# Patient Record
Sex: MALE | Race: WHITE | NOT HISPANIC OR LATINO | Employment: OTHER | URBAN - METROPOLITAN AREA
[De-identification: names, ages, dates, MRNs, and addresses within clinical notes are randomized per-mention and may not be internally consistent; named-entity substitution may affect disease eponyms.]

---

## 2017-01-03 PROCEDURE — 88305 TISSUE EXAM BY PATHOLOGIST: CPT | Performed by: INTERNAL MEDICINE

## 2017-01-05 PROBLEM — N32.81 OAB (OVERACTIVE BLADDER): Status: ACTIVE | Noted: 2017-01-05

## 2017-07-06 PROBLEM — R35.0 URINARY FREQUENCY: Status: ACTIVE | Noted: 2017-07-06

## 2017-07-06 PROBLEM — Z80.42 FAMILY HISTORY OF PROSTATE CANCER: Status: ACTIVE | Noted: 2017-07-06

## 2017-08-01 PROCEDURE — 86334 IMMUNOFIX E-PHORESIS SERUM: CPT | Performed by: OTHER

## 2017-08-01 PROCEDURE — 84165 PROTEIN E-PHORESIS SERUM: CPT | Performed by: OTHER

## 2017-08-01 PROCEDURE — 84238 ASSAY NONENDOCRINE RECEPTOR: CPT | Performed by: OTHER

## 2017-08-01 PROCEDURE — 82746 ASSAY OF FOLIC ACID SERUM: CPT | Performed by: OTHER

## 2017-08-01 PROCEDURE — 83516 IMMUNOASSAY NONANTIBODY: CPT | Performed by: OTHER

## 2017-08-01 PROCEDURE — 83519 RIA NONANTIBODY: CPT | Performed by: OTHER

## 2017-08-01 PROCEDURE — 86255 FLUORESCENT ANTIBODY SCREEN: CPT | Performed by: OTHER

## 2017-08-01 PROCEDURE — 83883 ASSAY NEPHELOMETRY NOT SPEC: CPT | Performed by: OTHER

## 2017-08-01 PROCEDURE — 82607 VITAMIN B-12: CPT | Performed by: OTHER

## 2017-10-23 PROBLEM — I70.0 AORTO-ILIAC ATHEROSCLEROSIS (HCC): Status: ACTIVE | Noted: 2017-10-23

## 2017-10-23 PROBLEM — F13.20 BENZODIAZEPINE DEPENDENCE (HCC): Status: ACTIVE | Noted: 2017-10-23

## 2017-10-23 PROBLEM — I50.32 CHRONIC DIASTOLIC CHF (CONGESTIVE HEART FAILURE) (HCC): Status: ACTIVE | Noted: 2017-10-23

## 2017-10-23 PROBLEM — I70.8 AORTO-ILIAC ATHEROSCLEROSIS (HCC): Status: ACTIVE | Noted: 2017-10-23

## 2017-11-07 PROBLEM — R26.81 GAIT INSTABILITY: Status: ACTIVE | Noted: 2017-11-07

## 2017-11-07 PROBLEM — M54.50 CHRONIC BILATERAL LOW BACK PAIN WITHOUT SCIATICA: Status: ACTIVE | Noted: 2017-11-07

## 2017-11-07 PROBLEM — G89.29 CHRONIC BILATERAL LOW BACK PAIN WITHOUT SCIATICA: Status: ACTIVE | Noted: 2017-11-07

## 2018-03-13 PROCEDURE — 81003 URINALYSIS AUTO W/O SCOPE: CPT | Performed by: INTERNAL MEDICINE

## 2018-10-26 PROCEDURE — 84402 ASSAY OF FREE TESTOSTERONE: CPT | Performed by: INTERNAL MEDICINE

## 2018-10-26 PROCEDURE — 84403 ASSAY OF TOTAL TESTOSTERONE: CPT | Performed by: INTERNAL MEDICINE

## 2019-03-28 PROCEDURE — 88311 DECALCIFY TISSUE: CPT | Performed by: RADIOLOGY

## 2019-03-28 PROCEDURE — 88307 TISSUE EXAM BY PATHOLOGIST: CPT | Performed by: RADIOLOGY

## 2019-03-28 PROCEDURE — 86141 C-REACTIVE PROTEIN HS: CPT | Performed by: RADIOLOGY

## 2019-03-28 PROCEDURE — 84402 ASSAY OF FREE TESTOSTERONE: CPT | Performed by: UROLOGY

## 2019-03-28 PROCEDURE — 88341 IMHCHEM/IMCYTCHM EA ADD ANTB: CPT | Performed by: RADIOLOGY

## 2019-03-28 PROCEDURE — 84403 ASSAY OF TOTAL TESTOSTERONE: CPT | Performed by: UROLOGY

## 2019-03-28 PROCEDURE — 88342 IMHCHEM/IMCYTCHM 1ST ANTB: CPT | Performed by: RADIOLOGY

## 2019-04-11 PROBLEM — C61 PROSTATE CANCER (HCC): Status: ACTIVE | Noted: 2019-04-11

## 2019-04-11 PROBLEM — F41.8 ANXIETY ABOUT HEALTH: Status: ACTIVE | Noted: 2019-04-11

## 2019-05-07 PROBLEM — F13.20 BENZODIAZEPINE DEPENDENCE (HCC): Status: RESOLVED | Noted: 2017-10-23 | Resolved: 2019-05-07

## 2019-06-21 PROBLEM — R29.898 WEAKNESS OF BOTH LOWER EXTREMITIES: Status: ACTIVE | Noted: 2019-06-21

## 2019-06-25 PROCEDURE — 84153 ASSAY OF PSA TOTAL: CPT | Performed by: RADIOLOGY

## 2019-06-25 PROCEDURE — 36415 COLL VENOUS BLD VENIPUNCTURE: CPT | Performed by: RADIOLOGY

## 2019-08-01 PROCEDURE — 84165 PROTEIN E-PHORESIS SERUM: CPT | Performed by: OTHER

## 2019-08-01 PROCEDURE — 83883 ASSAY NEPHELOMETRY NOT SPEC: CPT | Performed by: OTHER

## 2019-08-01 PROCEDURE — 86334 IMMUNOFIX E-PHORESIS SERUM: CPT | Performed by: OTHER

## 2019-08-21 PROBLEM — M54.17 L-S RADICULOPATHY: Status: ACTIVE | Noted: 2019-08-21

## 2019-08-21 PROBLEM — R26.9 GAIT ABNORMALITY: Status: ACTIVE | Noted: 2019-08-21

## 2019-08-21 PROBLEM — G62.9 POLYNEUROPATHY: Status: ACTIVE | Noted: 2019-08-21

## 2019-08-21 PROBLEM — M48.07 LUMBOSACRAL STENOSIS: Status: ACTIVE | Noted: 2019-08-21

## 2019-08-21 PROBLEM — R20.0 NUMBNESS: Status: ACTIVE | Noted: 2019-08-21

## 2020-01-20 PROBLEM — G89.29 CHRONIC RIGHT-SIDED LOW BACK PAIN WITHOUT SCIATICA: Status: ACTIVE | Noted: 2020-01-20

## 2020-01-20 PROBLEM — M54.50 CHRONIC RIGHT-SIDED LOW BACK PAIN WITHOUT SCIATICA: Status: ACTIVE | Noted: 2020-01-20

## 2020-01-20 PROBLEM — R29.3 POSTURE ABNORMALITY: Status: ACTIVE | Noted: 2020-01-20

## 2020-04-20 PROBLEM — C61 PROSTATE CANCER (HCC): Status: RESOLVED | Noted: 2019-04-11 | Resolved: 2020-04-20

## 2020-06-09 PROBLEM — F02.80 DEMENTIA IN OTHER DISEASES CLASSIFIED ELSEWHERE WITHOUT BEHAVIORAL DISTURBANCE (HCC): Status: ACTIVE | Noted: 2020-06-09

## 2020-06-09 PROBLEM — E66.01: Status: ACTIVE | Noted: 2020-06-09

## 2020-06-09 PROBLEM — G20 PARKINSON'S DISEASE (HCC): Status: ACTIVE | Noted: 2020-06-09

## 2020-06-12 PROBLEM — F32.1 CURRENT MODERATE EPISODE OF MAJOR DEPRESSIVE DISORDER WITHOUT PRIOR EPISODE (HCC): Status: ACTIVE | Noted: 2020-06-12

## 2020-08-28 PROBLEM — R26.9 GAIT ABNORMALITY: Status: RESOLVED | Noted: 2019-08-21 | Resolved: 2020-08-28

## 2020-08-28 PROBLEM — R29.3 POSTURE ABNORMALITY: Status: RESOLVED | Noted: 2020-01-20 | Resolved: 2020-08-28

## 2021-02-03 ENCOUNTER — IMMUNIZATION (OUTPATIENT)
Dept: LAB | Age: 76
End: 2021-02-03

## 2021-02-03 DIAGNOSIS — Z23 NEED FOR VACCINATION: Primary | ICD-10-CM

## 2021-02-03 PROCEDURE — 0011A COVID-19 MODERNA VACCINE: CPT

## 2021-02-03 PROCEDURE — 91301 COVID-19 MODERNA VACCINE: CPT

## 2021-02-09 PROBLEM — R20.0 NUMBNESS: Status: RESOLVED | Noted: 2019-08-21 | Resolved: 2021-02-09

## 2021-02-09 PROBLEM — E66.9 OBESITY (BMI 30-39.9): Status: ACTIVE | Noted: 2021-02-09

## 2021-02-15 ENCOUNTER — LAB ENCOUNTER (OUTPATIENT)
Dept: LAB | Facility: HOSPITAL | Age: 76
End: 2021-02-15
Attending: ORTHOPAEDIC SURGERY
Payer: MEDICARE

## 2021-02-15 DIAGNOSIS — M48.061 SPINAL STENOSIS OF LUMBAR REGION WITHOUT NEUROGENIC CLAUDICATION: ICD-10-CM

## 2021-02-15 LAB — SARS-COV-2 RNA RESP QL NAA+PROBE: NOT DETECTED

## 2021-02-18 ENCOUNTER — HOSPITAL ENCOUNTER (INPATIENT)
Facility: HOSPITAL | Age: 76
LOS: 6 days | Discharge: NURSING, REHAB OR LONG-TERM CARE | DRG: 460 | End: 2021-02-24
Attending: ORTHOPAEDIC SURGERY | Admitting: ORTHOPAEDIC SURGERY
Payer: MEDICARE

## 2021-02-18 ENCOUNTER — ANESTHESIA (OUTPATIENT)
Dept: SURGERY | Facility: HOSPITAL | Age: 76
DRG: 460 | End: 2021-02-18
Payer: MEDICARE

## 2021-02-18 ENCOUNTER — ANESTHESIA EVENT (OUTPATIENT)
Dept: SURGERY | Facility: HOSPITAL | Age: 76
DRG: 460 | End: 2021-02-18
Payer: MEDICARE

## 2021-02-18 ENCOUNTER — APPOINTMENT (OUTPATIENT)
Dept: GENERAL RADIOLOGY | Facility: HOSPITAL | Age: 76
DRG: 460 | End: 2021-02-18
Attending: ORTHOPAEDIC SURGERY
Payer: MEDICARE

## 2021-02-18 DIAGNOSIS — M48.061 SPINAL STENOSIS OF LUMBAR REGION WITHOUT NEUROGENIC CLAUDICATION: Primary | ICD-10-CM

## 2021-02-18 PROCEDURE — 72020 X-RAY EXAM OF SPINE 1 VIEW: CPT | Performed by: ORTHOPAEDIC SURGERY

## 2021-02-18 PROCEDURE — 5A09357 ASSISTANCE WITH RESPIRATORY VENTILATION, LESS THAN 24 CONSECUTIVE HOURS, CONTINUOUS POSITIVE AIRWAY PRESSURE: ICD-10-PCS | Performed by: ORTHOPAEDIC SURGERY

## 2021-02-18 PROCEDURE — 88304 TISSUE EXAM BY PATHOLOGIST: CPT | Performed by: ORTHOPAEDIC SURGERY

## 2021-02-18 PROCEDURE — 88311 DECALCIFY TISSUE: CPT | Performed by: ORTHOPAEDIC SURGERY

## 2021-02-18 PROCEDURE — 00UT0KZ SUPPLEMENT SPINAL MENINGES WITH NONAUTOLOGOUS TISSUE SUBSTITUTE, OPEN APPROACH: ICD-10-PCS | Performed by: ORTHOPAEDIC SURGERY

## 2021-02-18 PROCEDURE — 94660 CPAP INITIATION&MGMT: CPT

## 2021-02-18 PROCEDURE — 00BT0ZZ EXCISION OF SPINAL MENINGES, OPEN APPROACH: ICD-10-PCS | Performed by: ORTHOPAEDIC SURGERY

## 2021-02-18 PROCEDURE — 0SG1071 FUSION OF 2 OR MORE LUMBAR VERTEBRAL JOINTS WITH AUTOLOGOUS TISSUE SUBSTITUTE, POSTERIOR APPROACH, POSTERIOR COLUMN, OPEN APPROACH: ICD-10-PCS | Performed by: ORTHOPAEDIC SURGERY

## 2021-02-18 DEVICE — PATCH DURAL DURAMATRIX 1X3: Type: IMPLANTABLE DEVICE | Site: BACK | Status: FUNCTIONAL

## 2021-02-18 RX ORDER — DIPHENHYDRAMINE HCL 25 MG
25 CAPSULE ORAL EVERY 4 HOURS PRN
Status: DISCONTINUED | OUTPATIENT
Start: 2021-02-18 | End: 2021-02-24

## 2021-02-18 RX ORDER — ONDANSETRON 2 MG/ML
INJECTION INTRAMUSCULAR; INTRAVENOUS AS NEEDED
Status: DISCONTINUED | OUTPATIENT
Start: 2021-02-18 | End: 2021-02-18 | Stop reason: SURG

## 2021-02-18 RX ORDER — HYDRALAZINE HYDROCHLORIDE 20 MG/ML
INJECTION INTRAMUSCULAR; INTRAVENOUS
Status: DISCONTINUED
Start: 2021-02-18 | End: 2021-02-18 | Stop reason: WASHOUT

## 2021-02-18 RX ORDER — HYDROMORPHONE HYDROCHLORIDE 1 MG/ML
0.2 INJECTION, SOLUTION INTRAMUSCULAR; INTRAVENOUS; SUBCUTANEOUS EVERY 2 HOUR PRN
Status: DISCONTINUED | OUTPATIENT
Start: 2021-02-18 | End: 2021-02-24

## 2021-02-18 RX ORDER — ROCURONIUM BROMIDE 10 MG/ML
INJECTION, SOLUTION INTRAVENOUS AS NEEDED
Status: DISCONTINUED | OUTPATIENT
Start: 2021-02-18 | End: 2021-02-18 | Stop reason: SURG

## 2021-02-18 RX ORDER — MIDAZOLAM HYDROCHLORIDE 1 MG/ML
INJECTION INTRAMUSCULAR; INTRAVENOUS AS NEEDED
Status: DISCONTINUED | OUTPATIENT
Start: 2021-02-18 | End: 2021-02-18 | Stop reason: SURG

## 2021-02-18 RX ORDER — HYDROCODONE BITARTRATE AND ACETAMINOPHEN 5; 325 MG/1; MG/1
2 TABLET ORAL AS NEEDED
Status: DISCONTINUED | OUTPATIENT
Start: 2021-02-18 | End: 2021-02-18 | Stop reason: HOSPADM

## 2021-02-18 RX ORDER — ONDANSETRON 2 MG/ML
4 INJECTION INTRAMUSCULAR; INTRAVENOUS EVERY 4 HOURS PRN
Status: ACTIVE | OUTPATIENT
Start: 2021-02-18 | End: 2021-02-19

## 2021-02-18 RX ORDER — CEFAZOLIN SODIUM/WATER 2 G/20 ML
2 SYRINGE (ML) INTRAVENOUS ONCE
Status: COMPLETED | OUTPATIENT
Start: 2021-02-18 | End: 2021-02-18

## 2021-02-18 RX ORDER — CARVEDILOL 6.25 MG/1
6.25 TABLET ORAL 2 TIMES DAILY
Status: DISCONTINUED | OUTPATIENT
Start: 2021-02-18 | End: 2021-02-24

## 2021-02-18 RX ORDER — DIPHENHYDRAMINE HYDROCHLORIDE 50 MG/ML
25 INJECTION INTRAMUSCULAR; INTRAVENOUS EVERY 4 HOURS PRN
Status: DISCONTINUED | OUTPATIENT
Start: 2021-02-18 | End: 2021-02-24

## 2021-02-18 RX ORDER — HYDRALAZINE HYDROCHLORIDE 20 MG/ML
5 INJECTION INTRAMUSCULAR; INTRAVENOUS ONCE
Status: DISCONTINUED | OUTPATIENT
Start: 2021-02-18 | End: 2021-02-18 | Stop reason: HOSPADM

## 2021-02-18 RX ORDER — HYDROMORPHONE HYDROCHLORIDE 1 MG/ML
0.4 INJECTION, SOLUTION INTRAMUSCULAR; INTRAVENOUS; SUBCUTANEOUS EVERY 2 HOUR PRN
Status: DISCONTINUED | OUTPATIENT
Start: 2021-02-18 | End: 2021-02-24

## 2021-02-18 RX ORDER — GLYCOPYRROLATE 0.2 MG/ML
INJECTION, SOLUTION INTRAMUSCULAR; INTRAVENOUS AS NEEDED
Status: DISCONTINUED | OUTPATIENT
Start: 2021-02-18 | End: 2021-02-18 | Stop reason: SURG

## 2021-02-18 RX ORDER — VANCOMYCIN HYDROCHLORIDE 1 G/20ML
INJECTION, POWDER, LYOPHILIZED, FOR SOLUTION INTRAVENOUS AS NEEDED
Status: DISCONTINUED | OUTPATIENT
Start: 2021-02-18 | End: 2021-02-18 | Stop reason: HOSPADM

## 2021-02-18 RX ORDER — BUPIVACAINE HYDROCHLORIDE AND EPINEPHRINE 5; 5 MG/ML; UG/ML
INJECTION, SOLUTION EPIDURAL; INTRACAUDAL; PERINEURAL AS NEEDED
Status: DISCONTINUED | OUTPATIENT
Start: 2021-02-18 | End: 2021-02-18 | Stop reason: HOSPADM

## 2021-02-18 RX ORDER — METOCLOPRAMIDE HYDROCHLORIDE 5 MG/ML
10 INJECTION INTRAMUSCULAR; INTRAVENOUS AS NEEDED
Status: DISCONTINUED | OUTPATIENT
Start: 2021-02-18 | End: 2021-02-18 | Stop reason: HOSPADM

## 2021-02-18 RX ORDER — CEFAZOLIN SODIUM/WATER 2 G/20 ML
2 SYRINGE (ML) INTRAVENOUS EVERY 8 HOURS
Status: COMPLETED | OUTPATIENT
Start: 2021-02-18 | End: 2021-02-19

## 2021-02-18 RX ORDER — LIDOCAINE HYDROCHLORIDE 10 MG/ML
INJECTION, SOLUTION EPIDURAL; INFILTRATION; INTRACAUDAL; PERINEURAL AS NEEDED
Status: DISCONTINUED | OUTPATIENT
Start: 2021-02-18 | End: 2021-02-18 | Stop reason: SURG

## 2021-02-18 RX ORDER — DEXAMETHASONE SODIUM PHOSPHATE 4 MG/ML
4 VIAL (ML) INJECTION AS NEEDED
Status: DISCONTINUED | OUTPATIENT
Start: 2021-02-18 | End: 2021-02-18 | Stop reason: HOSPADM

## 2021-02-18 RX ORDER — METHOCARBAMOL 750 MG/1
750 TABLET, FILM COATED ORAL 3 TIMES DAILY
Status: DISCONTINUED | OUTPATIENT
Start: 2021-02-18 | End: 2021-02-24

## 2021-02-18 RX ORDER — HYDROMORPHONE HYDROCHLORIDE 1 MG/ML
0.8 INJECTION, SOLUTION INTRAMUSCULAR; INTRAVENOUS; SUBCUTANEOUS EVERY 2 HOUR PRN
Status: DISCONTINUED | OUTPATIENT
Start: 2021-02-18 | End: 2021-02-24

## 2021-02-18 RX ORDER — SODIUM CHLORIDE, SODIUM LACTATE, POTASSIUM CHLORIDE, CALCIUM CHLORIDE 600; 310; 30; 20 MG/100ML; MG/100ML; MG/100ML; MG/100ML
INJECTION, SOLUTION INTRAVENOUS CONTINUOUS
Status: DISCONTINUED | OUTPATIENT
Start: 2021-02-18 | End: 2021-02-18 | Stop reason: HOSPADM

## 2021-02-18 RX ORDER — SODIUM PHOSPHATE, DIBASIC AND SODIUM PHOSPHATE, MONOBASIC 7; 19 G/133ML; G/133ML
1 ENEMA RECTAL ONCE AS NEEDED
Status: DISCONTINUED | OUTPATIENT
Start: 2021-02-18 | End: 2021-02-24

## 2021-02-18 RX ORDER — OXYCODONE HYDROCHLORIDE 10 MG/1
10 TABLET ORAL EVERY 4 HOURS PRN
Status: DISCONTINUED | OUTPATIENT
Start: 2021-02-18 | End: 2021-02-19

## 2021-02-18 RX ORDER — ACETAMINOPHEN 325 MG/1
650 TABLET ORAL EVERY 4 HOURS PRN
Status: DISCONTINUED | OUTPATIENT
Start: 2021-02-18 | End: 2021-02-24

## 2021-02-18 RX ORDER — DEXAMETHASONE SODIUM PHOSPHATE 4 MG/ML
VIAL (ML) INJECTION AS NEEDED
Status: DISCONTINUED | OUTPATIENT
Start: 2021-02-18 | End: 2021-02-18 | Stop reason: SURG

## 2021-02-18 RX ORDER — EPHEDRINE SULFATE 50 MG/ML
INJECTION INTRAVENOUS AS NEEDED
Status: DISCONTINUED | OUTPATIENT
Start: 2021-02-18 | End: 2021-02-18 | Stop reason: SURG

## 2021-02-18 RX ORDER — ONDANSETRON 2 MG/ML
4 INJECTION INTRAMUSCULAR; INTRAVENOUS AS NEEDED
Status: DISCONTINUED | OUTPATIENT
Start: 2021-02-18 | End: 2021-02-18 | Stop reason: HOSPADM

## 2021-02-18 RX ORDER — ONDANSETRON 2 MG/ML
4 INJECTION INTRAMUSCULAR; INTRAVENOUS ONCE
Status: COMPLETED | OUTPATIENT
Start: 2021-02-18 | End: 2021-02-18

## 2021-02-18 RX ORDER — INSULIN ASPART 100 [IU]/ML
INJECTION, SOLUTION INTRAVENOUS; SUBCUTANEOUS ONCE
Status: DISCONTINUED | OUTPATIENT
Start: 2021-02-18 | End: 2021-02-18 | Stop reason: HOSPADM

## 2021-02-18 RX ORDER — ACETAMINOPHEN 500 MG
1000 TABLET ORAL ONCE
Status: COMPLETED | OUTPATIENT
Start: 2021-02-18 | End: 2021-02-23

## 2021-02-18 RX ORDER — DOCUSATE SODIUM 100 MG/1
100 CAPSULE, LIQUID FILLED ORAL 2 TIMES DAILY
Status: DISCONTINUED | OUTPATIENT
Start: 2021-02-18 | End: 2021-02-24

## 2021-02-18 RX ORDER — HYDROMORPHONE HYDROCHLORIDE 1 MG/ML
0.4 INJECTION, SOLUTION INTRAMUSCULAR; INTRAVENOUS; SUBCUTANEOUS EVERY 5 MIN PRN
Status: DISCONTINUED | OUTPATIENT
Start: 2021-02-18 | End: 2021-02-18 | Stop reason: HOSPADM

## 2021-02-18 RX ORDER — BISACODYL 10 MG
10 SUPPOSITORY, RECTAL RECTAL
Status: DISCONTINUED | OUTPATIENT
Start: 2021-02-18 | End: 2021-02-24

## 2021-02-18 RX ORDER — CELECOXIB 200 MG/1
200 CAPSULE ORAL ONCE
Status: COMPLETED | OUTPATIENT
Start: 2021-02-18 | End: 2021-02-18

## 2021-02-18 RX ORDER — SODIUM CHLORIDE, SODIUM LACTATE, POTASSIUM CHLORIDE, CALCIUM CHLORIDE 600; 310; 30; 20 MG/100ML; MG/100ML; MG/100ML; MG/100ML
INJECTION, SOLUTION INTRAVENOUS CONTINUOUS
Status: DISCONTINUED | OUTPATIENT
Start: 2021-02-18 | End: 2021-02-24

## 2021-02-18 RX ORDER — NEOSTIGMINE METHYLSULFATE 1 MG/ML
INJECTION INTRAVENOUS AS NEEDED
Status: DISCONTINUED | OUTPATIENT
Start: 2021-02-18 | End: 2021-02-18 | Stop reason: SURG

## 2021-02-18 RX ORDER — IBUPROFEN 600 MG/1
600 TABLET ORAL ONCE AS NEEDED
Status: DISCONTINUED | OUTPATIENT
Start: 2021-02-18 | End: 2021-02-18 | Stop reason: HOSPADM

## 2021-02-18 RX ORDER — NALOXONE HYDROCHLORIDE 0.4 MG/ML
80 INJECTION, SOLUTION INTRAMUSCULAR; INTRAVENOUS; SUBCUTANEOUS AS NEEDED
Status: DISCONTINUED | OUTPATIENT
Start: 2021-02-18 | End: 2021-02-18 | Stop reason: HOSPADM

## 2021-02-18 RX ORDER — SODIUM CHLORIDE 9 MG/ML
INJECTION, SOLUTION INTRAVENOUS CONTINUOUS
Status: DISCONTINUED | OUTPATIENT
Start: 2021-02-18 | End: 2021-02-20

## 2021-02-18 RX ORDER — ACETAMINOPHEN 500 MG
1000 TABLET ORAL ONCE
Status: ON HOLD | COMMUNITY
End: 2021-02-19

## 2021-02-18 RX ORDER — BACITRACIN 50000 [USP'U]/1
INJECTION, POWDER, LYOPHILIZED, FOR SOLUTION INTRAMUSCULAR AS NEEDED
Status: DISCONTINUED | OUTPATIENT
Start: 2021-02-18 | End: 2021-02-18 | Stop reason: HOSPADM

## 2021-02-18 RX ORDER — AMLODIPINE BESYLATE 5 MG/1
5 TABLET ORAL DAILY
Status: DISCONTINUED | OUTPATIENT
Start: 2021-02-19 | End: 2021-02-20

## 2021-02-18 RX ORDER — LOSARTAN POTASSIUM 50 MG/1
50 TABLET ORAL 2 TIMES DAILY
Status: DISCONTINUED | OUTPATIENT
Start: 2021-02-18 | End: 2021-02-24

## 2021-02-18 RX ORDER — PROCHLORPERAZINE EDISYLATE 5 MG/ML
10 INJECTION INTRAMUSCULAR; INTRAVENOUS EVERY 6 HOURS PRN
Status: ACTIVE | OUTPATIENT
Start: 2021-02-18 | End: 2021-02-20

## 2021-02-18 RX ORDER — HYDROCODONE BITARTRATE AND ACETAMINOPHEN 5; 325 MG/1; MG/1
1 TABLET ORAL AS NEEDED
Status: DISCONTINUED | OUTPATIENT
Start: 2021-02-18 | End: 2021-02-18 | Stop reason: HOSPADM

## 2021-02-18 RX ORDER — OXYCODONE HYDROCHLORIDE 5 MG/1
5 TABLET ORAL EVERY 4 HOURS PRN
Status: DISCONTINUED | OUTPATIENT
Start: 2021-02-18 | End: 2021-02-19

## 2021-02-18 RX ORDER — SENNOSIDES 8.6 MG
17.2 TABLET ORAL NIGHTLY
Status: DISCONTINUED | OUTPATIENT
Start: 2021-02-18 | End: 2021-02-24

## 2021-02-18 RX ORDER — POLYETHYLENE GLYCOL 3350 17 G/17G
17 POWDER, FOR SOLUTION ORAL DAILY PRN
Status: DISCONTINUED | OUTPATIENT
Start: 2021-02-18 | End: 2021-02-24

## 2021-02-18 RX ADMIN — EPHEDRINE SULFATE 10 MG: 50 INJECTION INTRAVENOUS at 10:30:00

## 2021-02-18 RX ADMIN — ONDANSETRON 4 MG: 2 INJECTION INTRAMUSCULAR; INTRAVENOUS at 09:55:00

## 2021-02-18 RX ADMIN — ROCURONIUM BROMIDE 10 MG: 10 INJECTION, SOLUTION INTRAVENOUS at 09:35:00

## 2021-02-18 RX ADMIN — CEFAZOLIN SODIUM/WATER 2 G: 2 G/20 ML SYRINGE (ML) INTRAVENOUS at 09:40:00

## 2021-02-18 RX ADMIN — SODIUM CHLORIDE, SODIUM LACTATE, POTASSIUM CHLORIDE, CALCIUM CHLORIDE: 600; 310; 30; 20 INJECTION, SOLUTION INTRAVENOUS at 11:35:00

## 2021-02-18 RX ADMIN — EPHEDRINE SULFATE 5 MG: 50 INJECTION INTRAVENOUS at 10:04:00

## 2021-02-18 RX ADMIN — NEOSTIGMINE METHYLSULFATE 4 MG: 1 INJECTION INTRAVENOUS at 11:09:00

## 2021-02-18 RX ADMIN — ROCURONIUM BROMIDE 10 MG: 10 INJECTION, SOLUTION INTRAVENOUS at 09:54:00

## 2021-02-18 RX ADMIN — EPHEDRINE SULFATE 10 MG: 50 INJECTION INTRAVENOUS at 10:02:00

## 2021-02-18 RX ADMIN — ROCURONIUM BROMIDE 10 MG: 10 INJECTION, SOLUTION INTRAVENOUS at 09:42:00

## 2021-02-18 RX ADMIN — MIDAZOLAM HYDROCHLORIDE 2 MG: 1 INJECTION INTRAMUSCULAR; INTRAVENOUS at 09:25:00

## 2021-02-18 RX ADMIN — LIDOCAINE HYDROCHLORIDE 50 MG: 10 INJECTION, SOLUTION EPIDURAL; INFILTRATION; INTRACAUDAL; PERINEURAL at 09:34:00

## 2021-02-18 RX ADMIN — DEXAMETHASONE SODIUM PHOSPHATE 8 MG: 4 MG/ML VIAL (ML) INJECTION at 09:55:00

## 2021-02-18 RX ADMIN — GLYCOPYRROLATE 0.4 MG: 0.2 INJECTION, SOLUTION INTRAMUSCULAR; INTRAVENOUS at 11:09:00

## 2021-02-18 NOTE — ANESTHESIA PROCEDURE NOTES
Arterial Line  Performed by: Selene Castillo MD  Authorized by: Selene Castillo MD     General Information and Staff    Procedure Start:   Anesthesiologist: Selene Castillo MD  Patient Location:  OR  Indication: continuous blood pressure monitoring and blood s

## 2021-02-18 NOTE — BRIEF OP NOTE
Pre-Operative Diagnosis: Spinal stenosis of lumbar region without neurogenic claudication [M48.061]     Post-Operative Diagnosis: Spinal stenosis of lumbar region without neurogenic claudication [M48.061]      Procedure Performed:   Procedure(s):  Lumbar 2

## 2021-02-18 NOTE — ANESTHESIA PROCEDURE NOTES
Airway  Urgency: elective    Difficult airway    General Information and Staff    Patient location during procedure: OR  Anesthesiologist: Ibrahima Garcia MD  Performed: anesthesiologist     Indications and Patient Condition  Indications for airway managemen

## 2021-02-18 NOTE — CONSULTS
General Medicine Consult      Reason for consult: medical management    Consulted by: Dr. Nelson Sow    PCP: Spencer Palmer MD      History of Present Illness: Patient is a 76year old male with mmp including but not limited to HTN/HL, ARIADNA, spinal stenosis Mckay   • OTHER SURGICAL HISTORY      bone biopsy   • ROBOT-ASSISTED LAPAROSCOPIC PROSTATECTOMY/ORICAL N/A 7/27/2015    Performed by Natalie Mcconnell MD at [facility] OR   • VASECTOMY          Home Medications:    •  acetaminophen 500 MG Oral Tab, Take 1,000 mg ringers Stopped (02/18/21 1133)   • sodium chloride       PRN Medication:sodium chloride 0.9%, PEG 3350, magnesium hydroxide, bisacodyl, Fleet Enema, ondansetron HCl, Prochlorperazine Edisylate, diphenhydrAMINE **OR** diphenhydrAMINE HCl, acetaminophen **O No visible rashes or lesions.     Neurologic:  Psychiatric: No focal deficits    appropriate affect,  memory intact      ASSESSMENT / PLAN:   76year old male with mmp including but not limited to HTN/HL, ARIADNA, spinal stenosis is consulted for medical manage

## 2021-02-18 NOTE — PROGRESS NOTES
S: moderate back pain with no leg pain. No new numbness or weakness. No headaches. Laying on his side, and appears to be comfortable. Inspection:  Awake alert No acute distress.  No difficulty breathing     Blood pressure 141/68, pulse 53, temperature

## 2021-02-18 NOTE — ANESTHESIA POSTPROCEDURE EVALUATION
76 [facility] Patient Status:  Inpatient   Age/Gender 76year old male MRN [id number]   [facility] SURGERY Attending Frank Sutton MD   Hosp Day # 0 PCP Getachew Javier MD       Anesthesia Post-op Note    Procedure

## 2021-02-18 NOTE — ANESTHESIA PREPROCEDURE EVALUATION
PRE-OP EVALUATION    Patient Name: Lynda Gist [facility]    Pre-op Diagnosis: Spinal stenosis of lumbar region without neurogenic claudication [M48.061]    Procedure(s):  Lumbar 2-Lumbar 3, Lumbar 3-Lumbar 4, Lumbar 4-Lumbar 5  decompression possible uninstr 0    •  tiZANidine HCl 2 MG Oral Tab, Take 1 tablet (2 mg total) by mouth every 6 (six) hours as needed. , Disp: 30 tablet, Rfl: 0        Allergies: Ditropan [Oxybutynin]      Anesthesia Evaluation    Patient summary reviewed.     Anesthetic Complications Component Value Date    WBC 6.88 02/10/2021    RBC 4.00 02/10/2021    HGB 13.9 02/10/2021    HCT 38.0 02/10/2021    MCV 95.0 02/10/2021    MCH 34.8 (H) 02/10/2021    MCHC 36.6 02/10/2021    RDW 12.8 02/10/2021     02/10/2021     Lab Results   Comp

## 2021-02-18 NOTE — ANESTHESIA PROCEDURE NOTES
Peripheral IV  Inserted by: Adilia Maguire MD    Placement  Needle size: 18 G  Laterality: right  Location: antecubital  Site prep: chlorhexidine  Attempts: 1

## 2021-02-18 NOTE — H&P
Agree with below. Patient would like to proceed with surgery. Lian Pollackhilaria Bullock is a 76year old male.   HPI:      Patient here for pre-op evaluation for planned Lumbar 2-Lumbar 3, Lumbar 3-Lumbar 4, Lumbar 4-Lumbar 5  decompression Propionate (FLONASE) 50 MCG/ACT Nasal Suspension 50 mcg by Nasal route daily as needed.                Past Medical History:   Diagnosis Date   • Anxiety state     • Back problem     • Compression fracture of L3 lumbar vertebra 1980s   • Congestive heart d ELECTROCARDIOGRAM, COMPLETE, MRSA / MSSA         PRE-OP, CBC W AUTO DIFF, COMP METABOLIC PANEL         (14), PT AND PTT, URINALYSIS WITH CULTURE         REFLEX, XR CHEST PA + LAT CHEST (CPT=71046)         - patient is good candidate for planned Lumbar 2-Michaela

## 2021-02-19 LAB
HCT VFR BLD AUTO: 33.7 %
HGB BLD-MCNC: 12.4 G/DL

## 2021-02-19 PROCEDURE — 85018 HEMOGLOBIN: CPT | Performed by: PHYSICIAN ASSISTANT

## 2021-02-19 PROCEDURE — 97165 OT EVAL LOW COMPLEX 30 MIN: CPT

## 2021-02-19 PROCEDURE — L1930 AFO PLASTIC: HCPCS

## 2021-02-19 PROCEDURE — 85014 HEMATOCRIT: CPT | Performed by: PHYSICIAN ASSISTANT

## 2021-02-19 PROCEDURE — 97116 GAIT TRAINING THERAPY: CPT

## 2021-02-19 PROCEDURE — 97535 SELF CARE MNGMENT TRAINING: CPT

## 2021-02-19 PROCEDURE — 97162 PT EVAL MOD COMPLEX 30 MIN: CPT

## 2021-02-19 RX ORDER — HYDROCODONE BITARTRATE AND ACETAMINOPHEN 10; 325 MG/1; MG/1
2 TABLET ORAL EVERY 6 HOURS PRN
Status: DISCONTINUED | OUTPATIENT
Start: 2021-02-19 | End: 2021-02-24

## 2021-02-19 RX ORDER — HYDROCODONE BITARTRATE AND ACETAMINOPHEN 10; 325 MG/1; MG/1
1 TABLET ORAL EVERY 6 HOURS PRN
Status: DISCONTINUED | OUTPATIENT
Start: 2021-02-19 | End: 2021-02-24

## 2021-02-19 NOTE — CONSULTS
.76 [facility] Patient Status:  Inpatient    1945 MRN [id number]   [facility] 3SW-A Attending Lissette Nunez MD   [facility] Day # 1 PCP Charmaine Han MD     Patient Identification  Wiley Amaral is a 76 y Lesion of pelvic bone     left   • Muscle weakness    • Muscular incoordination 8/10/2016    Resolved last addressed  11/2/16   • Neuropathy     idiopathic   • ARIADNA (obstructive sleep apnea) PSG 1/10/17    AHI 34 RDI 36 REM AHI 0 Supine AHI 17 non-supine AH [COMPLETED] ondansetron HCl (ZOFRAN) injection 4 mg, 4 mg, Intravenous, Once    •  [COMPLETED] ceFAZolin sodium (ANCEF/KEFZOL) 2 GM/20ML premix IV syringe 2 g, 2 g, Intravenous, Once    •  sodium chloride 0.9% IV bolus 500 mL, 500 mL, Intravenous, PRN    • BID        Social History    Tobacco Use      Smoking status: Never Smoker      Smokeless tobacco: Never Used    Alcohol use:  Yes      Alcohol/week: 1.7 - 2.5 standard drinks      Types: 2 - 3 Standard drinks or equivalent per week      Frequency: 2-3 time enlarged. Bowel sounds present. Musculoskeletal:     Right Upper Extremity:  Strength is 5. ROM WNL. Left Upper Extremity:  Strength is 5. ROM WNL. Right Lower Extremity:  Strength  is 5, except ankle DF/EHL 3-4. ROM WNL.    Left The patient has good potential to achieve the goal to return home at Mod I level of function. Advance directives reviewed and noted. Would be happy to reassess should medical and/or functional status change.    Will

## 2021-02-19 NOTE — OCCUPATIONAL THERAPY NOTE
OCCUPATIONAL THERAPY EVALUATION - INPATIENT     Room Number: 382/382-A  Evaluation Date: 2/19/2021  Type of Evaluation: Initial  Presenting Problem: s/p L2-5 decompression and fusion 2/18/21    Physician Order: IP Consult to Occupational Therapy  Reason fo MD ERNA at [facility] 21 2003    left inguinal   • HERNIA SURGERY  11/22/13    umb hernia   • LAPAROSCOPY, SURGICAL PROSTATECTOMY, RETROPUBIC RADICAL, W/NERVE SPARING  07/27/2015   • LUMBAR LAMINECTOMY FUSION W/ BONE GRAFT 3 LE within functional limits      Communication: WFL    Behavioral/Emotional/Social: WFL    RANGE OF MOTION AND STRENGTH ASSESSMENT  Upper extremity ROM is within functional limits    Upper extremity strength is within functional limits    COORDINATION  Gross reports difficulty with donning and doffing socks and shoes, R>L due to dynamic sitting balance challenges). Will further address LB dressing in future session.  Patient additionally educated on role of OT, safety, pain management, fall prevention, and disc Complexity  Occupational Profile/Medical History MODERATE - Expanded review of history including review of medical or therapy record   Specific performance deficits impacting engagement in ADL/IADL LOW  1 - 3 performance deficits    Client Assessment/Perfo

## 2021-02-19 NOTE — CM/SW NOTE
Received call from Christiana Gaucher ([phone]) with [facility]. Discussed recommendation for acute rehab at [facility]. She stated case will need to be reviewed by their medical director. Approval can take 24 hours. Insurance closed over the weekend.

## 2021-02-19 NOTE — CM/SW NOTE
02/19/21 1400   CM/SW Referral Data   Referral Source Social Work (self-referral)   Reason for Referral Discharge planning   Informant Patient;Spouse   Patient Info   Patient's Mental Status Alert;Oriented   Discharge Needs   Anticipated D/C needs Acute

## 2021-02-19 NOTE — PROGRESS NOTES
Wife Jeanie Noriega at bedside. Reviewed indications, side effects of pain medication/narcotics and constipation prevention.  Stressed importance of increased fluids/roughage in diet, continued use stool softeners along with laxatives and suppositories as needed whi

## 2021-02-19 NOTE — PROGRESS NOTES
[facility] Hospitalist Progress Note     PCP: Jose David Chapa MD    CC:  Follow up    SUBJECTIVE:   Pt sitting up in bed, pain manageable.  No cp/sob/n/v/f/c. +U, +flatus    OBJECTIVE:  Temp:  [97 °F (36.1 °C)-98.3 °F (36.8 °C)] 97.7 °F (36.5 °C)  Pulse:  [99-05 76year old male with mmp including but not limited to HTN/HL, ARIADNA, spinal stenosis is consulted for medical management s/p L2-5 surgery (see op note for details)     **Expected pain  -IV dilaudid prn, oxy prn-encourage transition to oral  -antiemetics, tylor

## 2021-02-19 NOTE — PHYSICAL THERAPY NOTE
PHYSICAL THERAPY EVALUATION - INPATIENT     Room Number: 382/382-A  Evaluation Date: 2/19/2021  Type of Evaluation: Initial  Physician Order: PT Eval and Treat    Presenting Problem: s/p L2-5 decomp/uninstrumentated fusion 2/18/21  Reason for Therapy BIOPSY, POSSIBLE POLYPECTOMY 89367 N/A 1/3/2017    Performed by Deangelo Zuleta MD at [facility] 21 2003    left inguinal   • HERNIA SURGERY  11/22/13    umb hernia   • LAPAROSCOPY, SURGICAL PROSTATECTOMY, RETROPUBIC RADIC within functional limits per OT, bilat UE strength is symmetrical    Lower extremity ROM is within functional limits except for the following:   Right Hip flexion wfl with assist, limited due to weakness  Right Dorsiflexion <neutral  Left Dorsiflexion <miranda Provided: pt recd in semi reclined position, educated in role of PT, goals for session, spine precautions (issued written instructions also), log roll technique.   Pt demonstrating already kyphotic posture in bed, but agreeable to flattening bed to simulate estrellita reilly. Pt appeared more lethargic this afternoon, but remains very motivated to work with PT, ambulate. Per Annel Travis, PA has ordered bilat AFO's for pt.     Exercise/Education Provided:  Bed mobility  Body mechanics  Energy conservation  Functional activ motion;Strengthening;Transfer training  Rehab Potential : Good  Frequency (Obs): 5x/week  Number of Visits to Meet Established Goals: 5      CURRENT GOALS    Goal #1 Patient is able to demonstrate supine - sit EOB @ level: minimum assistance     Goal #2 Pa

## 2021-02-19 NOTE — PLAN OF CARE
Pt AOx4. R.A. C/o mild incisional pain, declines pain medication. Microfoam tape to low back, CDI. REID drain to suction. VS remain stable. Voiding via goldstein, BM yesterday. Tolerating diet, denies n/v.   SCD/TEDs bilaterally, ankle pumps encouraged.  IS e

## 2021-02-19 NOTE — PROGRESS NOTES
S: moderate back pain with no leg pain. No new numbness. Strength does not feel improved when compared to pre op. No other concerns. No headaches. Inspection:  Awake alert No acute distress.  No difficulty breathing     Blood pressure 160/66, pulse

## 2021-02-19 NOTE — PLAN OF CARE
Alert and oriented x 4. VSS. On room air. ARIADNA w/ CPAP. Tele monitoring. . IS encouraged. SCDs/teds on bilaterally. Last BM 2/17. Oxycodone given for pain with relief. Microfoam tape to back C/D/I. REID drain to suction. Safety precautions in place.  Call l

## 2021-02-19 NOTE — CM/SW NOTE
Spoke with Dr Martha Mcclelland who recommends acute [place]hab [place]. Spoke with Heron Waller from [address] who will request insurance auth. DONN referrals to be sent for back-up plan. DON requested.   / to remain available for support and/or discharge plann

## 2021-02-20 PROCEDURE — 97116 GAIT TRAINING THERAPY: CPT

## 2021-02-20 PROCEDURE — 97535 SELF CARE MNGMENT TRAINING: CPT

## 2021-02-20 PROCEDURE — 97530 THERAPEUTIC ACTIVITIES: CPT

## 2021-02-20 RX ORDER — HYDROCHLOROTHIAZIDE 12.5 MG/1
12.5 CAPSULE, GELATIN COATED ORAL
Status: DISCONTINUED | OUTPATIENT
Start: 2021-02-20 | End: 2021-02-24

## 2021-02-20 RX ORDER — HYDROCHLOROTHIAZIDE 25 MG/1
12.5 TABLET ORAL 2 TIMES DAILY
Status: DISCONTINUED | OUTPATIENT
Start: 2021-02-20 | End: 2021-02-20

## 2021-02-20 NOTE — PLAN OF CARE
Alert and oriented x 4. VSS. On room air. . IS encouraged. Telemetry monitoring. ARIADNA with cpap. SCDs on BLE. Kessler in place - to be removed in AM per orders. Denies pain at this time. REID drain to suction. Tape to back C/D/I. Safety precautions in place.

## 2021-02-20 NOTE — PROGRESS NOTES
[facility] Hospitalist Progress Note     PCP: José Monsivais MD    CC:  Follow up    SUBJECTIVE:   Pt sitting up in bed, pain manageable. No cp/sob/n/v/f/c. +U, +flatus.  Says he does not take amlopine anymore and was dc'ed by cardiologist last week    OBJE claudication     76year old male with mmp including but not limited to HTN/HL, ARIADNA, spinal stenosis is consulted for medical management s/p L2-5 surgery (see op note for details)     **Expected pain  -IV dilaudid prn, oxy prn-encourage transition to oral

## 2021-02-20 NOTE — PLAN OF CARE
Patient up with PT today, max assist. Able to take a few steps, has AFO braces in both shoes. Drain to back with serosanguinous output 25 ml to bulb suction. Patient states that numbness to toes improved since surgery, has more feeling bilaterally.  Patient

## 2021-02-20 NOTE — PHYSICAL THERAPY NOTE
PHYSICAL THERAPY TREATMENT NOTE - INPATIENT    Room Number: 382/382-A     Session: 2   Number of Visits to Meet Established Goals: 5    Presenting Problem: s/p L2-5 decomp/uninstrumentated fusion 2/18/21    Problem List  Active Problems:    Spinal stenosi HISTORY  04/17/15    Prostate Biopsy - Dr. Erika Newsome   • OTHER SURGICAL HISTORY      bone biopsy   • ROBOT-ASSISTED LAPAROSCOPIC PROSTATECTOMY/ORICAL N/A 7/27/2015    Performed by Kristen Muhammad MD at [place] [address]  \"I want to ge Provided: Pt willing to participate in session, working towards increased functional mobility and independence. Discussed GOC, and short term/long term goals for optimal functional independence and safety.      Transfers     Supine<>Sit: Min A modified l (Obs): 5x/week    CURRENT GOALS     Goal #1 Patient is able to demonstrate supine - sit EOB @ level: minimum assistance      Goal #2 Patient is able to demonstrate transfers Sit to/from Stand at assistance level: minimum assistance      Goal #3 Patient is

## 2021-02-20 NOTE — PROGRESS NOTES
S: Patient awake in bed sitting up. Spouse at bed side. States back pain is minimal. No new numbness. Strength not improved since surgery. Patient spouse is concerned he is not getting out of bed enough. Inspection:  Awake alert No acute distress.  No d

## 2021-02-20 NOTE — PLAN OF CARE
Patient refused norvasc this am, states that he stopped taking last week per discussion with his Cardiologist Dr. Ming Rob.

## 2021-02-20 NOTE — PLAN OF CARE
Bilateral afo braces delivered and placed on patient by  orthotics. Up to chair with max asst of 2 people, with proper safety measures in place. Back dressing clean, dry, intact. Drain remains in place.   Instructed on plan of care, call don't fall

## 2021-02-20 NOTE — OCCUPATIONAL THERAPY NOTE
OCCUPATIONAL THERAPY TREATMENT NOTE - INPATIENT     Room Number: 382/382-A  Session: 1   Number of Visits to Meet Established Goals: 5    Presenting Problem: s/p L2-5 decompression and fusion 2/18/21    History related to current admission: Patient is 76 y umb hernia   • LAPAROSCOPY, SURGICAL PROSTATECTOMY, RETROPUBIC RADICAL, W/NERVE SPARING  07/27/2015   • LUMBAR LAMINECTOMY FUSION W/ BONE GRAFT 3 LEVEL N/A 2/18/2021    Performed by Karma Ivory MD at [address]   • OTHER Right PLUGGED OIL GLAND SAL A x 1 and SBA x 1, close chair follow for functional ambulation. Forward lean noted. Brief was soiled. Pt prefers to use his own Depends. Max A x 1 to stand from the chair. Total A to remove brief and to complete hygiene care.  Aide provided min A for s maintain spinal precautions during I/ADLs and functional mobility with verbal cues.      PPE worn by OT and therapy student: goggles, surgical masks, gloves  PPE worn by patient: surgical mask

## 2021-02-21 PROCEDURE — 97535 SELF CARE MNGMENT TRAINING: CPT

## 2021-02-21 PROCEDURE — 97110 THERAPEUTIC EXERCISES: CPT

## 2021-02-21 NOTE — PROGRESS NOTES
S: Patient seated in chair. States his back pain is well controlled. Denies numbness or pain in LEs. States he feels strong when he is walking. Drain discontinued this morning. Inspection:  Awake alert No acute distress.  No difficulty breathing     Blo

## 2021-02-21 NOTE — PLAN OF CARE
Back dressing changed, no drainage noted. Patient denies pain. Up with 2 person max assist, gait slow and steady. Plan for dc to  rehab tomorrow.

## 2021-02-21 NOTE — PROGRESS NOTES
[facility] Hospitalist Progress Note     PCP: Charmaine Han MD    CC:  Follow up    SUBJECTIVE:  Sitting up. Looks well. Pain controlled.       OBJECTIVE:  Temp:  [97.4 °F (36.3 °C)-98.7 °F (37.1 °C)] 98.3 °F (36.8 °C)  Pulse:  [57-69] 59  Resp:  [11-16] for medical management s/p L2-5 surgery (see op note for details)     **Expected pain  -IV dilaudid prn, oxy prn-encourage transition to oral  -antiemetics, bowel regimen     **expected anemia- HDS, not tachycardic    **spinal stenosis s/p L 2-5 surgery  -

## 2021-02-21 NOTE — OCCUPATIONAL THERAPY NOTE
OCCUPATIONAL THERAPY TREATMENT NOTE - INPATIENT     Room Number: 382/382-A  Session: 2  Number of Visits to Meet Established Goals: 5    Presenting Problem: s/p L2-5 decompression and fusion 2/18/21    History related to current admission: Patient is 76 ye umb hernia   • LAPAROSCOPY, SURGICAL PROSTATECTOMY, RETROPUBIC RADICAL, W/NERVE SPARING  07/27/2015   • LUMBAR LAMINECTOMY FUSION W/ BONE GRAFT 3 LEVEL N/A 2/18/2021    Performed by Laura Rhodes MD at [address] [place] [address]   • OTHER Right PLUGGED OIL GLAND SAL commode over toilet with sit>stand. He was DEP to doff shoes and MOD assist to don pants to knees using reacher.  MAX A to don shoes using long handled reacher, MAX A sit>stand from b/s chair and MIN A for standing balance to pull up pants over waist from k education;Patient/Family training;Neuromuscluar reeducation; Compensatory technique education  Rehab Potential : Good  Frequency (Obs): 3-5x/week      OT Goals:   Progressing 2/21  ADL Goals   Patient will perform lower body dressing:  with supervision and

## 2021-02-22 PROCEDURE — 97110 THERAPEUTIC EXERCISES: CPT

## 2021-02-22 PROCEDURE — 97530 THERAPEUTIC ACTIVITIES: CPT

## 2021-02-22 RX ORDER — METHOCARBAMOL 750 MG/1
750 TABLET, FILM COATED ORAL 3 TIMES DAILY
Qty: 20 TABLET | Refills: 0 | Status: SHIPPED | OUTPATIENT
Start: 2021-02-22 | End: 2021-05-11

## 2021-02-22 NOTE — CM/SW NOTE
Met with pt and pt's wife to discuss DC planning. Reviewed possible peer to peer with PMR MD.  Discussed DONN options. All questions/concerns addressed.   Await decision by PMR regarding peer to peer and insurance decision regarding coverage for acute vs S

## 2021-02-22 NOTE — CM/SW NOTE
Received voicemail from [facility] with Rick De Souza ([phone]) stating that their insurance medical director has denied acute and recommended DONN. She offered MD peer to peer if Dr Akin Chisholm would like to pursue coverage for acute rehab.     Spoke with Shamika Miner from

## 2021-02-22 NOTE — PROGRESS NOTES
S: Moderate back pain with no leg pain. Still weak with bilateral legs. No new numbness. No headaches. Sitting upright in chair, and looks comfortable. He is waiting for rehab vs SNF placement. Wife is with him.   He feels much more stable with walking

## 2021-02-22 NOTE — PHYSICAL THERAPY NOTE
PHYSICAL THERAPY SPINE TREATMENT NOTE - INPATIENT      Room Number: 382/382-A     Session: 3  Number of Visits to Meet Established Goals: 5    Presenting Problem: s/p L2-5 decomp/uninstrumentated fusion 2/18/21    Problem List  Active Problems:    Spinal s HISTORY  04/17/15    Prostate Biopsy - Dr. Jr Gracia   • OTHER SURGICAL HISTORY      bone biopsy   • ROBOT-ASSISTED LAPAROSCOPIC PROSTATECTOMY/ORICAL N/A 7/27/2015    Performed by Jen Pierce MD at [place] [address]  No c/o pain, d Rolling walker  Pattern: R Foot drag;R Foot flat;R Flexed knee;L Flexed knee(noting R foot clearance for 20% of steps before tiring)  Stoop/Curb Assistance: Not tested  Comment : n/a    Bed Mobility  Rolling to the Right = NT  Rolling to the Left = tactile precautions, I couldn't find the appropriate positioning in supine to stretch either hip flexor. Log rolling to EOB as above. Pt able to scoot forward at EOB with supervision.    Sit to stand and in standing at [facility], therapist pressing knees into further session.  Pt has made nice progress during his hospital stay and is appropriate for highly skilled PT rehab services that can incorporate appropriate stretching and strengthening within his spinal precautions, which will then improve his postural, energy, a device: walker - rolling at assistance level: minimum assistance      Goal #4 Demonstrate good understanding of precautions   Goal #5 Stair ambulation assessment when appropriate   Goal #6     Goal Comments: Goals established on 2/19/2021    All goals ongo

## 2021-02-22 NOTE — PLAN OF CARE
Plan of care discussed with patient and spouse at bedside. Back dressing changed done. Steri strips intact, old scant bloody drainage noted on old dressing. New sterile coverlet placed. Wearing AFO boots/shoes when out of bed as ordered. Goals discussed.  Sukhdev Harvey

## 2021-02-22 NOTE — PROGRESS NOTES
[facility] Hospitalist Progress Note     PCP: Marlen Whitten MD    CC:  Follow up    SUBJECTIVE:  Sitting up. Looks well. Pain controlled.       OBJECTIVE:  Temp:  [97.8 °F (36.6 °C)-98.3 °F (36.8 °C)] 97.8 °F (36.6 °C)  Pulse:  [58-67] 60  Resp:  [15-18] for medical management s/p L2-5 surgery (see op note for details)     **Expected pain  - controlled on oral pain regimen  -antiemetics, bowel regimen     **expected anemia- HDS, not tachycardic    **spinal stenosis s/p L 2-5 surgery  -management per spine,

## 2021-02-22 NOTE — PLAN OF CARE
[facility] A&O x4. VSS. Tele, NSR. ARIADNA w/cpap at night. Denies pain. Ambulating with max assist w/bilateral AFOs. Voids freely via urinal. Bilateral SCD's. Back dressing is C/D/I.  Reviewed POC, pain management, IS use, and

## 2021-02-22 NOTE — CM/SW NOTE
[facility]  who stated Dr Sree Heck has requested peer to peer with pt's insurance. This will be completed tomorrow per insurance and MD availability. Met with pt and pt's wife at bedside. Updated them to pending peer to peer.   Pt/wife sti

## 2021-02-23 LAB — SARS-COV-2 RNA RESP QL NAA+PROBE: NOT DETECTED

## 2021-02-23 PROCEDURE — 97535 SELF CARE MNGMENT TRAINING: CPT

## 2021-02-23 PROCEDURE — 97530 THERAPEUTIC ACTIVITIES: CPT

## 2021-02-23 NOTE — OPERATIVE REPORT
[facility]    PATIENT'S NAME: Queenie Ansari   ATTENDING PHYSICIAN: Lilian Otoole M.D. OPERATING PHYSICIAN: Lilian Otoole M.D.    PATIENT ACCOUNT#:   [de-identified]    LOCATION:  [address] [place] [address]  MEDICAL RECORD #:   [id number]       DATE OF BIRTH: the procedure requires an individual with substantial postgraduate training and substantial spinal surgical experience. ESTIMATED BLOOD LOSS:  50 mL. SPECIMENS:  L3-4 epidural mass. DRAINS:  1/8-inch Davol.      DESCRIPTION OF PROCEDURE:  After bilateral decompression and bilateral microforaminotomy and hemilaminotomy using undercutting technique. An undercutting facet sparing hemilaminectomy was also completed.  With undercutting technique and a Fernandez ball in place, we excised ligamentum flavum decompression as well. This was done using undercutting technique up to the level of the pedicle, with an extraforaminal area also being decompressed with the undercutting technique.   During the process of decompression, additional cystic pathology in exce an extraforaminal area also being decompressed with the undercutting technique. At the conclusion of the decompression, all areas were free of neurologic compression. After assessing the complete decompression, we evaluated the facets.   An excess of

## 2021-02-23 NOTE — OCCUPATIONAL THERAPY NOTE
OCCUPATIONAL THERAPY TREATMENT NOTE - INPATIENT     Room Number: 382/382-A  Session: 3   Number of Visits to Meet Established Goals: 5    Presenting Problem: s/p L2-5 decompression and fusion 2/18/21    History related to current admission: Patient is 75 y hernia   • LAPAROSCOPY, SURGICAL PROSTATECTOMY, RETROPUBIC RADICAL, W/NERVE SPARING  07/27/2015   • LUMBAR LAMINECTOMY FUSION W/ BONE GRAFT 3 LEVEL N/A 2/18/2021    Performed by Bj Romero MD at [address] [facility]   • OTHER Right PLUGGED OIL GLAND REMOVED CHAU seen for skilled OT treatment session this AM focused on safety and independence with ADLs and functional mobility to return to PLOF.  Reinforced education re: spinal precautions, use of LHAE, DME recommendations, body mechanics (\"nose over toes\"), energy his motivation, previous level of independence, and ability to tolerate and benefit from 3hrs daily therapy.      OT Discharge Recommendations: Acute rehabilitation  OT Device Recommendations: Reacher;Sock aid;Long-handled shoehorn    PLAN  OT Treatment Dany

## 2021-02-23 NOTE — PROGRESS NOTES
SUBJECTIVE: Pain localized to incision. Bilateral feet numbness. Continent    CC: Impaired ADL and mobility dysfuction due to Lumbar fusion  Interval History: Progressing in therapies.  Frustrated about his ongoing feet numbness/weakness  Scheduled Meds:  • from insurance for P2P. Approved for Acute inpt rehab. Will benefit from acute intense PT/OT 3 hrs/day and Orthotic eval for AFO's. ELOS ~2 weeks. MD LUCIA Justineboni [facility].

## 2021-02-23 NOTE — CM/SW NOTE
MSW spoke with Dr. Chris Encinas. TRINY DAWKINS has peer to peer for appeal at 2pm.  MSW awaiting determination.     Layne Goodwin LCSW

## 2021-02-23 NOTE — PROGRESS NOTES
[facility] Hospitalist Progress Note     PCP: Bar Ragland MD    CC:  Follow up    SUBJECTIVE:      Doing ok. Pain controlled. Awaiting placement.       OBJECTIVE:  Temp:  [97.5 °F (36.4 °C)-98.4 °F (36.9 °C)] 98.3 °F (36.8 °C)  Pulse:  [53-67] 62  Res surgery (see op note for details)     **Expected pain   - controlled on oral pain regimen  - antiemetics, bowel regimen     **expected anemia- HDS, not tachycardic    **spinal stenosis s/p L 2-5 surgery  -management per spine, IVF, abx, PT    **HTN/HL-cont

## 2021-02-23 NOTE — PLAN OF CARE
Alert and orientated. VSS. Back incision with coverlet clean dry and intact. States mild pain to back. Declining pain meds. Up in chair and ambulated around unit with AFO braces on, walker and mod assist. Plan of care reviewed. Bed alarm on.  Call light wit

## 2021-02-24 VITALS
SYSTOLIC BLOOD PRESSURE: 106 MMHG | DIASTOLIC BLOOD PRESSURE: 54 MMHG | RESPIRATION RATE: 12 BRPM | WEIGHT: 226.19 LBS | HEIGHT: 71 IN | HEART RATE: 58 BPM | OXYGEN SATURATION: 96 % | BODY MASS INDEX: 31.67 KG/M2 | TEMPERATURE: 98 F

## 2021-02-24 PROCEDURE — 97530 THERAPEUTIC ACTIVITIES: CPT

## 2021-02-24 PROCEDURE — 97116 GAIT TRAINING THERAPY: CPT

## 2021-02-24 NOTE — PLAN OF CARE
Back dressing clean, dry, intact. Has ambulated in hallway with assist, wearing bilateral afo braces and using walker. States tolerated activity well. States minimal pain.    Instructed on plan of care, pain management, call don't fall protocol, call for

## 2021-02-24 NOTE — CM/SW NOTE
02/24/21 1400   Discharge disposition   Expected discharge disposition [facility] [address]   Name of Facillity/Home Care/Hospice [facility]   Discharge transportation [facility]

## 2021-02-24 NOTE — PLAN OF CARE
A&O x4. VSS. Tele, SB/NSR. ARIADNA w/cpap at night. Denies pain. Ambulating with mod assist w/bilateral AFOs. Voids freely via urinal. Bilateral SCD's. Back coverlet dressing is C/D/I. Reviewed POC, pain management, IS use, and fall precautions with pt.  Bed al

## 2021-02-24 NOTE — PROGRESS NOTES
S: Moderate back pain with no leg pain. Still has weakness to his feet. He is upright in chair, and comfortable. He is going to [place] [address] rehab today. Inspection:  Awake alert No acute distress.  No difficulty breathing     Blood pressure 128/47, pulse 57, t

## 2021-02-24 NOTE — PROGRESS NOTES
Pt cleared by all MD's for discharge. PIV removed, belongings packed. Script for robaxin sent with pt. Facesheet, IP transfer report, and AVS sent with pt. Pt discharging [facility].     Spoke with Renny Greene RN from [facility]

## 2021-02-24 NOTE — CM/SW NOTE
Patient will discharge to [facility] today at 1pm via [address] going to room 1114. PCS form completed in [facility]. RN report: [phone]. RN notified.     Sara Rodriguez LCSW

## 2021-02-24 NOTE — PROGRESS NOTES
[facility] Hospitalist Progress Note     PCP: Julian Waller MD    CC:  Follow up    SUBJECTIVE:      pt doing well. No cp, sob, n/v or dizziness.       OBJECTIVE:  Temp:  [97.5 °F (36.4 °C)-98 °F (36.7 °C)] 98 °F (36.7 °C)  Pulse:  [57-60] 58  Resp:  [11-1

## 2021-03-03 ENCOUNTER — IMMUNIZATION (OUTPATIENT)
Dept: LAB | Age: 76
End: 2021-03-03

## 2021-03-03 DIAGNOSIS — Z23 NEED FOR VACCINATION: Primary | ICD-10-CM

## 2021-03-03 PROCEDURE — 0012A COVID-19 MODERNA VACCINE: CPT

## 2021-03-03 PROCEDURE — 91301 COVID-19 MODERNA VACCINE: CPT

## 2021-03-12 PROBLEM — C61 PROSTATE CA (HCC): Status: ACTIVE | Noted: 2021-03-12

## 2021-04-22 NOTE — DISCHARGE SUMMARY
[facility]  Discharge Summary    Melodie Vasques Patient Status:  Inpatient    1945 MRN [id number]   [facility] 3SW-A Attending No att. providers found   [facility] Day # 6 PCP Moreno Diallo MD     Date of Admission: 2021 throughout the hospital stay. The patient participated in Physical and Occupational Therapy making steady progressive gains. Once deemed stable by all services the patient was discharged on the above date.   Standard discharge instructions were given and Gordon Lilly  4/22/2021  2:18 PM

## 2021-06-15 ENCOUNTER — HOSPITAL ENCOUNTER (INPATIENT)
Age: 76
LOS: 4 days | Discharge: NURSING, REHAB OR LONG-TERM CARE | DRG: 552 | End: 2021-06-21
Attending: EMERGENCY MEDICINE | Admitting: HOSPITALIST

## 2021-06-15 DIAGNOSIS — R29.898 WEAKNESS OF BOTH LOWER EXTREMITIES: Primary | ICD-10-CM

## 2021-06-15 DIAGNOSIS — I87.2 VENOUS STASIS DERMATITIS OF BOTH LOWER EXTREMITIES: ICD-10-CM

## 2021-06-15 DIAGNOSIS — R60.9 PERIPHERAL EDEMA: ICD-10-CM

## 2021-06-15 LAB
ALBUMIN SERPL-MCNC: 3.6 G/DL (ref 3.6–5.1)
ALBUMIN/GLOB SERPL: 1.1 {RATIO} (ref 1–2.4)
ALP SERPL-CCNC: 91 UNITS/L (ref 45–117)
ALT SERPL-CCNC: 28 UNITS/L
ANION GAP SERPL CALC-SCNC: 6 MMOL/L (ref 10–20)
AST SERPL-CCNC: 28 UNITS/L
BASOPHILS # BLD: 0.1 K/MCL (ref 0–0.3)
BASOPHILS NFR BLD: 1 %
BILIRUB SERPL-MCNC: 0.4 MG/DL (ref 0.2–1)
BUN SERPL-MCNC: 21 MG/DL (ref 6–20)
BUN/CREAT SERPL: 24 (ref 7–25)
CALCIUM SERPL-MCNC: 9 MG/DL (ref 8.4–10.2)
CHLORIDE SERPL-SCNC: 107 MMOL/L (ref 98–107)
CO2 SERPL-SCNC: 30 MMOL/L (ref 21–32)
CREAT SERPL-MCNC: 0.87 MG/DL (ref 0.67–1.17)
DEPRECATED RDW RBC: 44.1 FL (ref 39–50)
EOSINOPHIL # BLD: 0.4 K/MCL (ref 0–0.5)
EOSINOPHIL NFR BLD: 5 %
ERYTHROCYTE [DISTWIDTH] IN BLOOD: 13 % (ref 11–15)
FASTING DURATION TIME PATIENT: ABNORMAL H
GFR SERPLBLD BASED ON 1.73 SQ M-ARVRAT: 84 ML/MIN/1.73M2
GLOBULIN SER-MCNC: 3.4 G/DL (ref 2–4)
GLUCOSE BLDC GLUCOMTR-MCNC: 97 MG/DL (ref 70–99)
GLUCOSE SERPL-MCNC: 97 MG/DL (ref 65–99)
HCT VFR BLD CALC: 37.7 % (ref 39–51)
HGB BLD-MCNC: 13 G/DL (ref 13–17)
IMM GRANULOCYTES # BLD AUTO: 0 K/MCL (ref 0–0.2)
IMM GRANULOCYTES # BLD: 1 %
LYMPHOCYTES # BLD: 1.2 K/MCL (ref 1–4)
LYMPHOCYTES NFR BLD: 14 %
MCH RBC QN AUTO: 32.5 PG (ref 26–34)
MCHC RBC AUTO-ENTMCNC: 34.5 G/DL (ref 32–36.5)
MCV RBC AUTO: 94.3 FL (ref 78–100)
MONOCYTES # BLD: 1 K/MCL (ref 0.3–0.9)
MONOCYTES NFR BLD: 12 %
NEUTROPHILS # BLD: 5.8 K/MCL (ref 1.8–7.7)
NEUTROPHILS NFR BLD: 67 %
NRBC BLD MANUAL-RTO: 0 /100 WBC
PLATELET # BLD AUTO: 267 K/MCL (ref 140–450)
POTASSIUM SERPL-SCNC: 3.8 MMOL/L (ref 3.4–5.1)
PROT SERPL-MCNC: 7 G/DL (ref 6.4–8.2)
RBC # BLD: 4 MIL/MCL (ref 4.5–5.9)
SODIUM SERPL-SCNC: 139 MMOL/L (ref 135–145)
WBC # BLD: 8.5 K/MCL (ref 4.2–11)

## 2021-06-15 PROCEDURE — 82550 ASSAY OF CK (CPK): CPT | Performed by: EMERGENCY MEDICINE

## 2021-06-15 PROCEDURE — 85652 RBC SED RATE AUTOMATED: CPT | Performed by: EMERGENCY MEDICINE

## 2021-06-15 PROCEDURE — 86140 C-REACTIVE PROTEIN: CPT | Performed by: EMERGENCY MEDICINE

## 2021-06-15 PROCEDURE — 82306 VITAMIN D 25 HYDROXY: CPT | Performed by: HOSPITALIST

## 2021-06-15 PROCEDURE — 36415 COLL VENOUS BLD VENIPUNCTURE: CPT

## 2021-06-15 PROCEDURE — 80053 COMPREHEN METABOLIC PANEL: CPT | Performed by: EMERGENCY MEDICINE

## 2021-06-15 PROCEDURE — 83880 ASSAY OF NATRIURETIC PEPTIDE: CPT | Performed by: EMERGENCY MEDICINE

## 2021-06-15 PROCEDURE — 85025 COMPLETE CBC W/AUTO DIFF WBC: CPT | Performed by: EMERGENCY MEDICINE

## 2021-06-15 PROCEDURE — 82962 GLUCOSE BLOOD TEST: CPT

## 2021-06-15 PROCEDURE — 99285 EMERGENCY DEPT VISIT HI MDM: CPT

## 2021-06-15 RX ORDER — AZELASTINE 1 MG/ML
SPRAY, METERED NASAL
Status: ON HOLD | COMMUNITY
Start: 2020-06-09 | End: 2021-06-16 | Stop reason: ALTCHOICE

## 2021-06-15 RX ORDER — ALPRAZOLAM 0.25 MG/1
0.25 TABLET ORAL
Status: ON HOLD | COMMUNITY
Start: 2015-05-11 | End: 2021-06-16 | Stop reason: ALTCHOICE

## 2021-06-15 RX ORDER — CARVEDILOL 6.25 MG/1
6.25 TABLET ORAL 2 TIMES DAILY WITH MEALS
COMMUNITY
Start: 2021-03-12 | End: 2022-03-29 | Stop reason: ALTCHOICE

## 2021-06-15 RX ORDER — FLUTICASONE PROPIONATE 50 MCG
50 SPRAY, SUSPENSION (ML) NASAL AT BEDTIME
COMMUNITY

## 2021-06-15 RX ORDER — LOSARTAN POTASSIUM 50 MG/1
50 TABLET ORAL DAILY
COMMUNITY
Start: 2014-10-08

## 2021-06-15 RX ORDER — HYDROCHLOROTHIAZIDE 12.5 MG/1
12.5 TABLET ORAL DAILY
COMMUNITY
Start: 2021-03-12 | End: 2022-03-29 | Stop reason: ALTCHOICE

## 2021-06-16 ENCOUNTER — APPOINTMENT (OUTPATIENT)
Dept: ULTRASOUND IMAGING | Age: 76
DRG: 552 | End: 2021-06-16
Attending: PSYCHIATRY & NEUROLOGY

## 2021-06-16 ENCOUNTER — APPOINTMENT (OUTPATIENT)
Dept: MRI IMAGING | Age: 76
DRG: 552 | End: 2021-06-16
Attending: PSYCHIATRY & NEUROLOGY

## 2021-06-16 ENCOUNTER — APPOINTMENT (OUTPATIENT)
Dept: GENERAL RADIOLOGY | Age: 76
DRG: 552 | End: 2021-06-16
Attending: EMERGENCY MEDICINE

## 2021-06-16 ENCOUNTER — APPOINTMENT (OUTPATIENT)
Dept: CT IMAGING | Age: 76
DRG: 552 | End: 2021-06-16
Attending: EMERGENCY MEDICINE

## 2021-06-16 PROBLEM — R26.9 GAIT ABNORMALITY: Status: ACTIVE | Noted: 2021-06-16

## 2021-06-16 PROBLEM — M48.061 LUMBAR STENOSIS: Status: ACTIVE | Noted: 2021-06-16

## 2021-06-16 PROBLEM — I11.0 HYPERTENSIVE HEART DISEASE WITH CONGESTIVE HEART FAILURE (HCC): Status: ACTIVE | Noted: 2021-06-16

## 2021-06-16 PROBLEM — G62.9 NEUROPATHY: Status: ACTIVE | Noted: 2021-06-16

## 2021-06-16 PROBLEM — M54.16 LUMBAR RADICULOPATHY: Status: ACTIVE | Noted: 2021-06-16

## 2021-06-16 LAB
APPEARANCE UR: CLEAR
BILIRUB UR QL STRIP: NEGATIVE
CK SERPL-CCNC: 137 UNITS/L (ref 39–308)
CK SERPL-CCNC: 84 UNITS/L (ref 39–308)
COLOR UR: YELLOW
CRP SERPL-MCNC: 0.4 MG/DL
ERYTHROCYTE [SEDIMENTATION RATE] IN BLOOD BY WESTERGREN METHOD: 18 MM/HR (ref 0–20)
GLUCOSE UR STRIP-MCNC: NEGATIVE MG/DL
HGB UR QL STRIP: NEGATIVE
KETONES UR STRIP-MCNC: 20 MG/DL
LEUKOCYTE ESTERASE UR QL STRIP: NEGATIVE
NITRITE UR QL STRIP: NEGATIVE
NT-PROBNP SERPL-MCNC: 72 PG/ML
PH UR STRIP: 5 [PH] (ref 5–7)
PROT UR STRIP-MCNC: NEGATIVE MG/DL
RAINBOW EXTRA TUBES HOLD SPECIMEN: NORMAL
SP GR UR STRIP: 1.02 (ref 1–1.03)
UROBILINOGEN UR STRIP-MCNC: 0.2 MG/DL

## 2021-06-16 PROCEDURE — 10002803 HB RX 637: Performed by: HOSPITALIST

## 2021-06-16 PROCEDURE — 72141 MRI NECK SPINE W/O DYE: CPT

## 2021-06-16 PROCEDURE — G0378 HOSPITAL OBSERVATION PER HR: HCPCS

## 2021-06-16 PROCEDURE — 72192 CT PELVIS W/O DYE: CPT

## 2021-06-16 PROCEDURE — 94640 AIRWAY INHALATION TREATMENT: CPT

## 2021-06-16 PROCEDURE — 10004281 HB COUNTER-STAFF TIME PER 15 MIN

## 2021-06-16 PROCEDURE — 96372 THER/PROPH/DIAG INJ SC/IM: CPT

## 2021-06-16 PROCEDURE — 10004651 HB RX, NO CHARGE ITEM: Performed by: HOSPITALIST

## 2021-06-16 PROCEDURE — 72131 CT LUMBAR SPINE W/O DYE: CPT

## 2021-06-16 PROCEDURE — 72148 MRI LUMBAR SPINE W/O DYE: CPT

## 2021-06-16 PROCEDURE — 81003 URINALYSIS AUTO W/O SCOPE: CPT | Performed by: EMERGENCY MEDICINE

## 2021-06-16 PROCEDURE — 99223 1ST HOSP IP/OBS HIGH 75: CPT | Performed by: SPECIALIST

## 2021-06-16 PROCEDURE — 36415 COLL VENOUS BLD VENIPUNCTURE: CPT | Performed by: PSYCHIATRY & NEUROLOGY

## 2021-06-16 PROCEDURE — 96375 TX/PRO/DX INJ NEW DRUG ADDON: CPT

## 2021-06-16 PROCEDURE — 82550 ASSAY OF CK (CPK): CPT | Performed by: PSYCHIATRY & NEUROLOGY

## 2021-06-16 PROCEDURE — 70450 CT HEAD/BRAIN W/O DYE: CPT

## 2021-06-16 PROCEDURE — 73562 X-RAY EXAM OF KNEE 3: CPT

## 2021-06-16 PROCEDURE — 93970 EXTREMITY STUDY: CPT

## 2021-06-16 PROCEDURE — 96374 THER/PROPH/DIAG INJ IV PUSH: CPT

## 2021-06-16 PROCEDURE — 10002800 HB RX 250 W HCPCS: Performed by: HOSPITALIST

## 2021-06-16 PROCEDURE — 10002800 HB RX 250 W HCPCS: Performed by: PSYCHIATRY & NEUROLOGY

## 2021-06-16 PROCEDURE — 72146 MRI CHEST SPINE W/O DYE: CPT

## 2021-06-16 RX ORDER — HYDRALAZINE HYDROCHLORIDE 20 MG/ML
10 INJECTION INTRAMUSCULAR; INTRAVENOUS EVERY 6 HOURS PRN
Status: DISCONTINUED | OUTPATIENT
Start: 2021-06-16 | End: 2021-06-21 | Stop reason: HOSPADM

## 2021-06-16 RX ORDER — 0.9 % SODIUM CHLORIDE 0.9 %
2 VIAL (ML) INJECTION EVERY 12 HOURS SCHEDULED
Status: DISCONTINUED | OUTPATIENT
Start: 2021-06-16 | End: 2021-06-21 | Stop reason: HOSPADM

## 2021-06-16 RX ORDER — ENOXAPARIN SODIUM 100 MG/ML
40 INJECTION SUBCUTANEOUS EVERY 24 HOURS
Status: DISCONTINUED | OUTPATIENT
Start: 2021-06-16 | End: 2021-06-21 | Stop reason: HOSPADM

## 2021-06-16 RX ORDER — ONDANSETRON 2 MG/ML
4 INJECTION INTRAMUSCULAR; INTRAVENOUS EVERY 6 HOURS PRN
Status: ACTIVE | OUTPATIENT
Start: 2021-06-16 | End: 2021-06-21

## 2021-06-16 RX ORDER — FLUTICASONE PROPIONATE 50 MCG
50 SPRAY, SUSPENSION (ML) NASAL AT BEDTIME
Status: DISCONTINUED | OUTPATIENT
Start: 2021-06-16 | End: 2021-06-21 | Stop reason: HOSPADM

## 2021-06-16 RX ORDER — CARVEDILOL 3.12 MG/1
6.25 TABLET ORAL 2 TIMES DAILY WITH MEALS
Status: DISCONTINUED | OUTPATIENT
Start: 2021-06-16 | End: 2021-06-21 | Stop reason: HOSPADM

## 2021-06-16 RX ORDER — POLYETHYLENE GLYCOL 3350 17 G/17G
17 POWDER, FOR SOLUTION ORAL DAILY
Status: DISCONTINUED | OUTPATIENT
Start: 2021-06-16 | End: 2021-06-21 | Stop reason: HOSPADM

## 2021-06-16 RX ORDER — LORAZEPAM 2 MG/ML
1 INJECTION INTRAMUSCULAR
Status: COMPLETED | OUTPATIENT
Start: 2021-06-16 | End: 2021-06-16

## 2021-06-16 RX ORDER — LOSARTAN POTASSIUM 50 MG/1
50 TABLET ORAL DAILY
Status: DISCONTINUED | OUTPATIENT
Start: 2021-06-16 | End: 2021-06-21 | Stop reason: HOSPADM

## 2021-06-16 RX ORDER — HYDROCHLOROTHIAZIDE 12.5 MG/1
12.5 TABLET ORAL DAILY
Status: DISCONTINUED | OUTPATIENT
Start: 2021-06-16 | End: 2021-06-21 | Stop reason: HOSPADM

## 2021-06-16 RX ORDER — ACETAMINOPHEN 325 MG/1
650 TABLET ORAL EVERY 4 HOURS PRN
Status: DISCONTINUED | OUTPATIENT
Start: 2021-06-16 | End: 2021-06-21 | Stop reason: HOSPADM

## 2021-06-16 RX ADMIN — LOSARTAN POTASSIUM 50 MG: 50 TABLET, FILM COATED ORAL at 20:13

## 2021-06-16 RX ADMIN — HYDRALAZINE HYDROCHLORIDE 10 MG: 20 INJECTION INTRAMUSCULAR; INTRAVENOUS at 17:35

## 2021-06-16 RX ADMIN — HYDROCHLOROTHIAZIDE 12.5 MG: 12.5 TABLET ORAL at 20:13

## 2021-06-16 RX ADMIN — SODIUM CHLORIDE, PRESERVATIVE FREE 2 ML: 5 INJECTION INTRAVENOUS at 20:13

## 2021-06-16 RX ADMIN — ENOXAPARIN SODIUM 40 MG: 40 INJECTION SUBCUTANEOUS at 20:13

## 2021-06-16 RX ADMIN — LORAZEPAM 1 MG: 2 INJECTION INTRAMUSCULAR; INTRAVENOUS at 10:53

## 2021-06-16 RX ADMIN — CARVEDILOL 6.25 MG: 3.12 TABLET, FILM COATED ORAL at 20:13

## 2021-06-16 RX ADMIN — FLUTICASONE PROPIONATE 1 SPRAY: 50 SPRAY, METERED NASAL at 23:35

## 2021-06-16 ASSESSMENT — COLUMBIA-SUICIDE SEVERITY RATING SCALE - C-SSRS
1. WITHIN THE PAST MONTH, HAVE YOU WISHED YOU WERE DEAD OR WISHED YOU COULD GO TO SLEEP AND NOT WAKE UP?: NO
IS THE PATIENT ABLE TO COMPLETE C-SSRS: YES
2. HAVE YOU ACTUALLY HAD ANY THOUGHTS OF KILLING YOURSELF?: NO
6. HAVE YOU EVER DONE ANYTHING, STARTED TO DO ANYTHING, OR PREPARED TO DO ANYTHING TO END YOUR LIFE?: NO

## 2021-06-16 ASSESSMENT — PATIENT HEALTH QUESTIONNAIRE - PHQ9
CLINICAL INTERPRETATION OF PHQ9 SCORE: NO FURTHER SCREENING NEEDED
IS PATIENT ABLE TO COMPLETE PHQ2 OR PHQ9: YES
2. FEELING DOWN, DEPRESSED OR HOPELESS: NOT AT ALL
SUM OF ALL RESPONSES TO PHQ9 QUESTIONS 1 AND 2: 0
1. LITTLE INTEREST OR PLEASURE IN DOING THINGS: NOT AT ALL
CLINICAL INTERPRETATION OF PHQ2 SCORE: NO FURTHER SCREENING NEEDED
SUM OF ALL RESPONSES TO PHQ9 QUESTIONS 1 AND 2: 0

## 2021-06-16 ASSESSMENT — COGNITIVE AND FUNCTIONAL STATUS - GENERAL
ARE YOU BLIND OR DO YOU HAVE SERIOUS DIFFICULTY SEEING, EVEN WHEN WEARING GLASSES: NO
DO YOU HAVE DIFFICULTY DRESSING OR BATHING: NO
BECAUSE OF A PHYSICAL, MENTAL, OR EMOTIONAL CONDITION, DO YOU HAVE DIFFICULTY DOING ERRANDS ALONE: NO
BECAUSE OF A PHYSICAL, MENTAL, OR EMOTIONAL CONDITION, DO YOU HAVE SERIOUS DIFFICULTY CONCENTRATING, REMEMBERING OR MAKING DECISIONS: NO
DO YOU HAVE SERIOUS DIFFICULTY WALKING OR CLIMBING STAIRS: YES
DO YOU HAVE DIFFICULTY DRESSING OR BATHING: NO
BECAUSE OF A PHYSICAL, MENTAL, OR EMOTIONAL CONDITION, DO YOU HAVE SERIOUS DIFFICULTY CONCENTRATING, REMEMBERING OR MAKING DECISIONS: NO
DO YOU HAVE SERIOUS DIFFICULTY WALKING OR CLIMBING STAIRS: NO
DO YOU HAVE DIFFICULTY DRESSING OR BATHING: NO
DO YOU HAVE SERIOUS DIFFICULTY WALKING OR CLIMBING STAIRS: NO
BECAUSE OF A PHYSICAL, MENTAL, OR EMOTIONAL CONDITION, DO YOU HAVE DIFFICULTY DOING ERRANDS ALONE: NO
BECAUSE OF A PHYSICAL, MENTAL, OR EMOTIONAL CONDITION, DO YOU HAVE DIFFICULTY DOING ERRANDS ALONE: NO
ARE YOU DEAF OR DO YOU HAVE SERIOUS DIFFICULTY  HEARING: YES
BECAUSE OF A PHYSICAL, MENTAL, OR EMOTIONAL CONDITION, DO YOU HAVE SERIOUS DIFFICULTY CONCENTRATING, REMEMBERING OR MAKING DECISIONS: NO

## 2021-06-16 ASSESSMENT — ENCOUNTER SYMPTOMS
NAUSEA: 0
FEVER: 0
CHILLS: 0
WEAKNESS: 1
VOMITING: 0
COUGH: 0
ABDOMINAL PAIN: 0
SORE THROAT: 0
ORTHOPNEA: 0
DIZZINESS: 0
WEIGHT LOSS: 0
SHORTNESS OF BREATH: 0
DOUBLE VISION: 0

## 2021-06-16 ASSESSMENT — PAIN SCALES - GENERAL
PAINLEVEL_OUTOF10: 0

## 2021-06-16 ASSESSMENT — LIFESTYLE VARIABLES
HOW OFTEN DO YOU HAVE A DRINK CONTAINING ALCOHOL: MONTHLY OR LESS
HOW OFTEN DO YOU HAVE 6 OR MORE DRINKS ON ONE OCCASION: NEVER
AUDIT-C TOTAL SCORE: 1
ALCOHOL_USE_STATUS: NO OR LOW RISK WITH VALIDATED TOOL
HOW MANY STANDARD DRINKS CONTAINING ALCOHOL DO YOU HAVE ON A TYPICAL DAY: 0,1 OR 2
SUBSTANCE_ABUSE: 0

## 2021-06-16 ASSESSMENT — ACTIVITIES OF DAILY LIVING (ADL)
ADL_BEFORE_ADMISSION: INDEPENDENT
ADL_SCORE: 12
MOBILITY_ASSIST_DEVICES: FRONT-WHEELED WALKER
CHRONIC_PAIN_PRESENT: NO
ADL_SHORT_OF_BREATH: NO
RECENT_DECLINE_ADL: NO

## 2021-06-17 ENCOUNTER — TELEPHONE (OUTPATIENT)
Dept: SCHEDULING | Age: 76
End: 2021-06-17

## 2021-06-17 PROCEDURE — G0378 HOSPITAL OBSERVATION PER HR: HCPCS

## 2021-06-17 PROCEDURE — 94640 AIRWAY INHALATION TREATMENT: CPT

## 2021-06-17 PROCEDURE — 96372 THER/PROPH/DIAG INJ SC/IM: CPT

## 2021-06-17 PROCEDURE — 92610 EVALUATE SWALLOWING FUNCTION: CPT

## 2021-06-17 PROCEDURE — 97530 THERAPEUTIC ACTIVITIES: CPT

## 2021-06-17 PROCEDURE — 97162 PT EVAL MOD COMPLEX 30 MIN: CPT

## 2021-06-17 PROCEDURE — 10002803 HB RX 637: Performed by: HOSPITALIST

## 2021-06-17 PROCEDURE — 99232 SBSQ HOSP IP/OBS MODERATE 35: CPT | Performed by: NURSE PRACTITIONER

## 2021-06-17 PROCEDURE — 10000002 HB ROOM CHARGE MED SURG

## 2021-06-17 PROCEDURE — 10004281 HB COUNTER-STAFF TIME PER 15 MIN

## 2021-06-17 PROCEDURE — 10004651 HB RX, NO CHARGE ITEM: Performed by: HOSPITALIST

## 2021-06-17 PROCEDURE — 10002800 HB RX 250 W HCPCS: Performed by: HOSPITALIST

## 2021-06-17 PROCEDURE — 97167 OT EVAL HIGH COMPLEX 60 MIN: CPT

## 2021-06-17 RX ADMIN — SODIUM CHLORIDE, PRESERVATIVE FREE 2 ML: 5 INJECTION INTRAVENOUS at 08:02

## 2021-06-17 RX ADMIN — LOSARTAN POTASSIUM 50 MG: 50 TABLET, FILM COATED ORAL at 08:02

## 2021-06-17 RX ADMIN — CARVEDILOL 6.25 MG: 3.12 TABLET, FILM COATED ORAL at 18:25

## 2021-06-17 RX ADMIN — POLYETHYLENE GLYCOL (3350) 17 G: 17 POWDER, FOR SOLUTION ORAL at 08:02

## 2021-06-17 RX ADMIN — FLUTICASONE PROPIONATE 1 SPRAY: 50 SPRAY, METERED NASAL at 21:21

## 2021-06-17 RX ADMIN — CARVEDILOL 6.25 MG: 3.12 TABLET, FILM COATED ORAL at 08:01

## 2021-06-17 RX ADMIN — HYDROCHLOROTHIAZIDE 12.5 MG: 12.5 TABLET ORAL at 08:02

## 2021-06-17 RX ADMIN — ENOXAPARIN SODIUM 40 MG: 40 INJECTION SUBCUTANEOUS at 20:43

## 2021-06-17 RX ADMIN — SODIUM CHLORIDE, PRESERVATIVE FREE 2 ML: 5 INJECTION INTRAVENOUS at 20:43

## 2021-06-17 ASSESSMENT — PAIN SCALES - GENERAL
PAINLEVEL_OUTOF10: 0
PAINLEVEL_OUTOF10: 0

## 2021-06-17 ASSESSMENT — COGNITIVE AND FUNCTIONAL STATUS - GENERAL
BASIC_MOBILITY_CONVERTED_SCORE: 25.80
APPLIED_COGNITIVE_CONVERTED_SCORE: 39.77
REMEMBERING 5 ERRANDS WITH NO LIST: A LITTLE
DAILY_ACTIVITY_CONVERTED_SCORE: 34.69
BASIC_MOBILITY_RAW_SCORE: 9
DAILY_ACTIVITY_RAW_SCORE: 15
HELP NEEDED DRESSING REGULAR LOWER BODY CLOTHING: TOTAL
APPLIED_COGNITIVE_RAW_SCORE: 19
HELP NEEDED FOR TOILETING: TOTAL
HELP NEEDED FOR BATHING: A LOT
HELP NEEDED DRESSING REGULAR UPPER BODY CLOTHING: A LITTLE
REMEMBERING TO TAKE MEDICATION: A LITTLE
TAKING CARE OF COMPLICATED TASKS: A LITTLE
REMEMBERING WHERE THINGS ARE: A LITTLE
FOLLOWS FAMILIAR CONVERSATION: A LITTLE

## 2021-06-17 ASSESSMENT — ENCOUNTER SYMPTOMS
SHORTNESS OF BREATH: 0
SENSORY CHANGE: 0
COUGH: 0
WEAKNESS: 1

## 2021-06-18 ENCOUNTER — APPOINTMENT (OUTPATIENT)
Dept: GENERAL RADIOLOGY | Age: 76
DRG: 552 | End: 2021-06-18
Attending: PSYCHIATRY & NEUROLOGY

## 2021-06-18 PROCEDURE — U0005 INFEC AGEN DETEC AMPLI PROBE: HCPCS | Performed by: HOSPITALIST

## 2021-06-18 PROCEDURE — 10004281 HB COUNTER-STAFF TIME PER 15 MIN

## 2021-06-18 PROCEDURE — 97110 THERAPEUTIC EXERCISES: CPT

## 2021-06-18 PROCEDURE — 10004651 HB RX, NO CHARGE ITEM: Performed by: HOSPITALIST

## 2021-06-18 PROCEDURE — 74230 X-RAY XM SWLNG FUNCJ C+: CPT

## 2021-06-18 PROCEDURE — 10002800 HB RX 250 W HCPCS: Performed by: HOSPITALIST

## 2021-06-18 PROCEDURE — 92611 MOTION FLUOROSCOPY/SWALLOW: CPT

## 2021-06-18 PROCEDURE — 10002803 HB RX 637: Performed by: HOSPITALIST

## 2021-06-18 PROCEDURE — 99232 SBSQ HOSP IP/OBS MODERATE 35: CPT | Performed by: NURSE PRACTITIONER

## 2021-06-18 PROCEDURE — 10000002 HB ROOM CHARGE MED SURG

## 2021-06-18 PROCEDURE — 97535 SELF CARE MNGMENT TRAINING: CPT

## 2021-06-18 RX ADMIN — CARVEDILOL 6.25 MG: 3.12 TABLET, FILM COATED ORAL at 09:23

## 2021-06-18 RX ADMIN — CARVEDILOL 6.25 MG: 3.12 TABLET, FILM COATED ORAL at 17:31

## 2021-06-18 RX ADMIN — SODIUM CHLORIDE, PRESERVATIVE FREE 2 ML: 5 INJECTION INTRAVENOUS at 09:32

## 2021-06-18 RX ADMIN — HYDROCHLOROTHIAZIDE 12.5 MG: 12.5 TABLET ORAL at 09:23

## 2021-06-18 RX ADMIN — ENOXAPARIN SODIUM 40 MG: 40 INJECTION SUBCUTANEOUS at 20:40

## 2021-06-18 RX ADMIN — LOSARTAN POTASSIUM 50 MG: 50 TABLET, FILM COATED ORAL at 09:23

## 2021-06-18 RX ADMIN — SODIUM CHLORIDE, PRESERVATIVE FREE 2 ML: 5 INJECTION INTRAVENOUS at 20:40

## 2021-06-18 RX ADMIN — POLYETHYLENE GLYCOL (3350) 17 G: 17 POWDER, FOR SOLUTION ORAL at 09:23

## 2021-06-18 RX ADMIN — HYDRALAZINE HYDROCHLORIDE 10 MG: 20 INJECTION INTRAMUSCULAR; INTRAVENOUS at 03:20

## 2021-06-18 ASSESSMENT — PAIN SCALES - GENERAL
PAINLEVEL_OUTOF10: 0
PAINLEVEL_OUTOF10: 0

## 2021-06-18 ASSESSMENT — COGNITIVE AND FUNCTIONAL STATUS - GENERAL
APPLIED_COGNITIVE_CONVERTED_SCORE: 41.76
HELP NEEDED FOR TOILETING: TOTAL
REMEMBERING TO TAKE MEDICATION: A LITTLE
TAKING CARE OF COMPLICATED TASKS: A LITTLE
TAKING CARE OF COMPLICATED TASKS: A LITTLE
HELP NEEDED DRESSING REGULAR UPPER BODY CLOTHING: A LITTLE
REMEMBERING WHERE THINGS ARE: A LITTLE
APPLIED_COGNITIVE_RAW_SCORE: 19
APPLIED_COGNITIVE_RAW_SCORE: 20
REMEMBERING 5 ERRANDS WITH NO LIST: A LITTLE
APPLIED_COGNITIVE_CONVERTED_SCORE: 39.77
REMEMBERING TO TAKE MEDICATION: A LITTLE
UNDERSTANDING 10 TO 15 MIN SPEECH: A LITTLE
HELP NEEDED DRESSING REGULAR LOWER BODY CLOTHING: A LOT
DAILY_ACTIVITY_RAW_SCORE: 17
DAILY_ACTIVITY_CONVERTED_SCORE: 37.26
REMEMBERING 5 ERRANDS WITH NO LIST: A LITTLE
HELP NEEDED FOR BATHING: A LITTLE
REMEMBERING WHERE THINGS ARE: A LITTLE

## 2021-06-18 ASSESSMENT — ACTIVITIES OF DAILY LIVING (ADL): HOME_MANAGEMENT_TIME_ENTRY: 12

## 2021-06-18 ASSESSMENT — ENCOUNTER SYMPTOMS
VOMITING: 0
SENSORY CHANGE: 0
COUGH: 0
WEAKNESS: 1
PAIN SEVERITY NOW: 0

## 2021-06-19 LAB
25(OH)D3+25(OH)D2 SERPL-MCNC: 26.3 NG/ML (ref 30–100)
ANION GAP SERPL CALC-SCNC: 8 MMOL/L (ref 10–20)
BUN SERPL-MCNC: 18 MG/DL (ref 6–20)
BUN/CREAT SERPL: 25 (ref 7–25)
CALCIUM SERPL-MCNC: 8.6 MG/DL (ref 8.4–10.2)
CHLORIDE SERPL-SCNC: 108 MMOL/L (ref 98–107)
CO2 SERPL-SCNC: 28 MMOL/L (ref 21–32)
CREAT SERPL-MCNC: 0.72 MG/DL (ref 0.67–1.17)
DEPRECATED RDW RBC: 44.3 FL (ref 39–50)
ERYTHROCYTE [DISTWIDTH] IN BLOOD: 13 % (ref 11–15)
FASTING DURATION TIME PATIENT: ABNORMAL H
GFR SERPLBLD BASED ON 1.73 SQ M-ARVRAT: >90 ML/MIN/1.73M2
GLUCOSE SERPL-MCNC: 96 MG/DL (ref 65–99)
HCT VFR BLD CALC: 37.5 % (ref 39–51)
HGB BLD-MCNC: 12.7 G/DL (ref 13–17)
MCH RBC QN AUTO: 31.6 PG (ref 26–34)
MCHC RBC AUTO-ENTMCNC: 33.9 G/DL (ref 32–36.5)
MCV RBC AUTO: 93.3 FL (ref 78–100)
NRBC BLD MANUAL-RTO: 0 /100 WBC
PLATELET # BLD AUTO: 238 K/MCL (ref 140–450)
POTASSIUM SERPL-SCNC: 3.7 MMOL/L (ref 3.4–5.1)
RBC # BLD: 4.02 MIL/MCL (ref 4.5–5.9)
SARS-COV-2 RNA RESP QL NAA+PROBE: NOT DETECTED
SERVICE CMNT-IMP: NORMAL
SERVICE CMNT-IMP: NORMAL
SODIUM SERPL-SCNC: 140 MMOL/L (ref 135–145)
WBC # BLD: 7.1 K/MCL (ref 4.2–11)

## 2021-06-19 PROCEDURE — 10002803 HB RX 637: Performed by: HOSPITALIST

## 2021-06-19 PROCEDURE — 80048 BASIC METABOLIC PNL TOTAL CA: CPT | Performed by: HOSPITALIST

## 2021-06-19 PROCEDURE — 10004281 HB COUNTER-STAFF TIME PER 15 MIN

## 2021-06-19 PROCEDURE — 36415 COLL VENOUS BLD VENIPUNCTURE: CPT | Performed by: HOSPITALIST

## 2021-06-19 PROCEDURE — 10000002 HB ROOM CHARGE MED SURG

## 2021-06-19 PROCEDURE — 94640 AIRWAY INHALATION TREATMENT: CPT

## 2021-06-19 PROCEDURE — 92526 ORAL FUNCTION THERAPY: CPT

## 2021-06-19 PROCEDURE — 10002800 HB RX 250 W HCPCS: Performed by: HOSPITALIST

## 2021-06-19 PROCEDURE — 10004651 HB RX, NO CHARGE ITEM: Performed by: HOSPITALIST

## 2021-06-19 PROCEDURE — 85027 COMPLETE CBC AUTOMATED: CPT | Performed by: HOSPITALIST

## 2021-06-19 RX ADMIN — HYDROCHLOROTHIAZIDE 12.5 MG: 12.5 TABLET ORAL at 08:02

## 2021-06-19 RX ADMIN — SODIUM CHLORIDE, PRESERVATIVE FREE 2 ML: 5 INJECTION INTRAVENOUS at 08:04

## 2021-06-19 RX ADMIN — LOSARTAN POTASSIUM 50 MG: 50 TABLET, FILM COATED ORAL at 08:02

## 2021-06-19 RX ADMIN — ENOXAPARIN SODIUM 40 MG: 40 INJECTION SUBCUTANEOUS at 20:29

## 2021-06-19 RX ADMIN — FLUTICASONE PROPIONATE 1 SPRAY: 50 SPRAY, METERED NASAL at 21:12

## 2021-06-19 RX ADMIN — SODIUM CHLORIDE, PRESERVATIVE FREE 2 ML: 5 INJECTION INTRAVENOUS at 20:29

## 2021-06-19 RX ADMIN — CARVEDILOL 6.25 MG: 3.12 TABLET, FILM COATED ORAL at 18:15

## 2021-06-19 RX ADMIN — CARVEDILOL 6.25 MG: 3.12 TABLET, FILM COATED ORAL at 08:02

## 2021-06-19 ASSESSMENT — COGNITIVE AND FUNCTIONAL STATUS - GENERAL
APPLIED_COGNITIVE_RAW_SCORE: 23
APPLIED_COGNITIVE_CONVERTED_SCORE: 53.08
TAKING CARE OF COMPLICATED TASKS: A LITTLE

## 2021-06-19 ASSESSMENT — PAIN SCALES - GENERAL
PAINLEVEL_OUTOF10: 0
PAINLEVEL_OUTOF10: 0

## 2021-06-20 LAB
ANION GAP SERPL CALC-SCNC: 7 MMOL/L (ref 10–20)
BUN SERPL-MCNC: 19 MG/DL (ref 6–20)
BUN/CREAT SERPL: 25 (ref 7–25)
CALCIUM SERPL-MCNC: 8.4 MG/DL (ref 8.4–10.2)
CHLORIDE SERPL-SCNC: 109 MMOL/L (ref 98–107)
CO2 SERPL-SCNC: 28 MMOL/L (ref 21–32)
CREAT SERPL-MCNC: 0.76 MG/DL (ref 0.67–1.17)
DEPRECATED RDW RBC: 44.4 FL (ref 39–50)
ERYTHROCYTE [DISTWIDTH] IN BLOOD: 13 % (ref 11–15)
FASTING DURATION TIME PATIENT: ABNORMAL H
GFR SERPLBLD BASED ON 1.73 SQ M-ARVRAT: 89 ML/MIN/1.73M2
GLUCOSE SERPL-MCNC: 95 MG/DL (ref 65–99)
HCT VFR BLD CALC: 36.1 % (ref 39–51)
HGB BLD-MCNC: 12.3 G/DL (ref 13–17)
MCH RBC QN AUTO: 32.1 PG (ref 26–34)
MCHC RBC AUTO-ENTMCNC: 34.1 G/DL (ref 32–36.5)
MCV RBC AUTO: 94.3 FL (ref 78–100)
NRBC BLD MANUAL-RTO: 0 /100 WBC
PLATELET # BLD AUTO: 238 K/MCL (ref 140–450)
POTASSIUM SERPL-SCNC: 3.7 MMOL/L (ref 3.4–5.1)
RBC # BLD: 3.83 MIL/MCL (ref 4.5–5.9)
SODIUM SERPL-SCNC: 140 MMOL/L (ref 135–145)
WBC # BLD: 7.8 K/MCL (ref 4.2–11)

## 2021-06-20 PROCEDURE — 10002803 HB RX 637: Performed by: HOSPITALIST

## 2021-06-20 PROCEDURE — 85027 COMPLETE CBC AUTOMATED: CPT | Performed by: HOSPITALIST

## 2021-06-20 PROCEDURE — 10002800 HB RX 250 W HCPCS: Performed by: HOSPITALIST

## 2021-06-20 PROCEDURE — 36415 COLL VENOUS BLD VENIPUNCTURE: CPT | Performed by: HOSPITALIST

## 2021-06-20 PROCEDURE — 94640 AIRWAY INHALATION TREATMENT: CPT

## 2021-06-20 PROCEDURE — 80048 BASIC METABOLIC PNL TOTAL CA: CPT | Performed by: HOSPITALIST

## 2021-06-20 PROCEDURE — 10004651 HB RX, NO CHARGE ITEM: Performed by: HOSPITALIST

## 2021-06-20 PROCEDURE — 10004281 HB COUNTER-STAFF TIME PER 15 MIN

## 2021-06-20 PROCEDURE — 10000002 HB ROOM CHARGE MED SURG

## 2021-06-20 RX ADMIN — FLUTICASONE PROPIONATE 1 SPRAY: 50 SPRAY, METERED NASAL at 21:48

## 2021-06-20 RX ADMIN — CARVEDILOL 6.25 MG: 3.12 TABLET, FILM COATED ORAL at 09:50

## 2021-06-20 RX ADMIN — LOSARTAN POTASSIUM 50 MG: 50 TABLET, FILM COATED ORAL at 09:51

## 2021-06-20 RX ADMIN — HYDROCHLOROTHIAZIDE 12.5 MG: 12.5 TABLET ORAL at 09:51

## 2021-06-20 RX ADMIN — CARVEDILOL 6.25 MG: 3.12 TABLET, FILM COATED ORAL at 18:31

## 2021-06-20 RX ADMIN — ENOXAPARIN SODIUM 40 MG: 40 INJECTION SUBCUTANEOUS at 21:11

## 2021-06-20 RX ADMIN — SODIUM CHLORIDE, PRESERVATIVE FREE 2 ML: 5 INJECTION INTRAVENOUS at 09:52

## 2021-06-20 RX ADMIN — SODIUM CHLORIDE, PRESERVATIVE FREE 2 ML: 5 INJECTION INTRAVENOUS at 21:11

## 2021-06-20 ASSESSMENT — PAIN SCALES - GENERAL
PAINLEVEL_OUTOF10: 0
PAINLEVEL_OUTOF10: 0

## 2021-06-21 VITALS
SYSTOLIC BLOOD PRESSURE: 137 MMHG | HEART RATE: 55 BPM | DIASTOLIC BLOOD PRESSURE: 72 MMHG | WEIGHT: 225.09 LBS | HEIGHT: 70 IN | BODY MASS INDEX: 32.22 KG/M2 | OXYGEN SATURATION: 96 % | RESPIRATION RATE: 14 BRPM | TEMPERATURE: 97.3 F

## 2021-06-21 PROCEDURE — 10004651 HB RX, NO CHARGE ITEM: Performed by: HOSPITALIST

## 2021-06-21 PROCEDURE — 10002803 HB RX 637: Performed by: HOSPITALIST

## 2021-06-21 PROCEDURE — 97530 THERAPEUTIC ACTIVITIES: CPT

## 2021-06-21 PROCEDURE — 97116 GAIT TRAINING THERAPY: CPT

## 2021-06-21 PROCEDURE — 92526 ORAL FUNCTION THERAPY: CPT

## 2021-06-21 RX ADMIN — POLYETHYLENE GLYCOL (3350) 17 G: 17 POWDER, FOR SOLUTION ORAL at 07:41

## 2021-06-21 RX ADMIN — SODIUM CHLORIDE, PRESERVATIVE FREE 2 ML: 5 INJECTION INTRAVENOUS at 07:41

## 2021-06-21 RX ADMIN — HYDROCHLOROTHIAZIDE 12.5 MG: 12.5 TABLET ORAL at 07:41

## 2021-06-21 RX ADMIN — CARVEDILOL 6.25 MG: 3.12 TABLET, FILM COATED ORAL at 07:40

## 2021-06-21 RX ADMIN — CARVEDILOL 6.25 MG: 3.12 TABLET, FILM COATED ORAL at 17:44

## 2021-06-21 RX ADMIN — LOSARTAN POTASSIUM 50 MG: 50 TABLET, FILM COATED ORAL at 07:41

## 2021-06-21 ASSESSMENT — COGNITIVE AND FUNCTIONAL STATUS - GENERAL
BASIC_MOBILITY_CONVERTED_SCORE: 25.80
BASIC_MOBILITY_RAW_SCORE: 9

## 2021-06-21 ASSESSMENT — PAIN SCALES - GENERAL: PAINLEVEL_OUTOF10: 0

## 2021-08-03 PROBLEM — G20 PARKINSON'S DISEASE (HCC): Status: RESOLVED | Noted: 2020-06-09 | Resolved: 2021-08-03

## 2021-10-15 PROBLEM — C61 PROSTATE CA (HCC): Status: RESOLVED | Noted: 2021-03-12 | Resolved: 2021-10-15

## 2022-03-29 ENCOUNTER — HOSPITAL ENCOUNTER (OUTPATIENT)
Dept: WOUND CARE | Age: 77
Discharge: STILL ADMITTED OR ADMINISTRATIVE | End: 2022-03-29
Attending: PODIATRIST

## 2022-03-29 ENCOUNTER — HOSPITAL ENCOUNTER (OUTPATIENT)
Dept: GENERAL RADIOLOGY | Age: 77
Discharge: HOME | End: 2022-03-29

## 2022-03-29 VITALS
OXYGEN SATURATION: 98 % | TEMPERATURE: 97.5 F | HEART RATE: 57 BPM | SYSTOLIC BLOOD PRESSURE: 171 MMHG | DIASTOLIC BLOOD PRESSURE: 78 MMHG

## 2022-03-29 DIAGNOSIS — G47.33 OSA ON CPAP: ICD-10-CM

## 2022-03-29 DIAGNOSIS — S90.822A BLISTER OF LEFT HEEL, INITIAL ENCOUNTER: ICD-10-CM

## 2022-03-29 DIAGNOSIS — M79.672 PAIN OF LEFT HEEL: Primary | ICD-10-CM

## 2022-03-29 DIAGNOSIS — F41.9 ANXIETY: ICD-10-CM

## 2022-03-29 DIAGNOSIS — L89.620 PRESSURE ULCER, HEEL, LEFT, UNSTAGEABLE (CMD): ICD-10-CM

## 2022-03-29 DIAGNOSIS — G62.9 PERIPHERAL POLYNEUROPATHY: ICD-10-CM

## 2022-03-29 DIAGNOSIS — C61 PROSTATE CANCER (CMD): ICD-10-CM

## 2022-03-29 DIAGNOSIS — M54.50 CHRONIC LOW BACK PAIN, UNSPECIFIED BACK PAIN LATERALITY, UNSPECIFIED WHETHER SCIATICA PRESENT: ICD-10-CM

## 2022-03-29 DIAGNOSIS — M79.672 PAIN OF LEFT HEEL: ICD-10-CM

## 2022-03-29 DIAGNOSIS — R60.0 LEG EDEMA, LEFT: ICD-10-CM

## 2022-03-29 DIAGNOSIS — G62.9 NEUROPATHY: ICD-10-CM

## 2022-03-29 DIAGNOSIS — I10 ESSENTIAL HYPERTENSION, BENIGN: ICD-10-CM

## 2022-03-29 DIAGNOSIS — G89.29 CHRONIC LOW BACK PAIN, UNSPECIFIED BACK PAIN LATERALITY, UNSPECIFIED WHETHER SCIATICA PRESENT: ICD-10-CM

## 2022-03-29 PROCEDURE — 73650 X-RAY EXAM OF HEEL: CPT

## 2022-03-29 PROCEDURE — 99213 OFFICE O/P EST LOW 20 MIN: CPT

## 2022-03-29 RX ORDER — CARVEDILOL 3.12 MG/1
TABLET ORAL
COMMUNITY
Start: 2022-01-22

## 2022-03-29 RX ORDER — FUROSEMIDE 20 MG/1
20 TABLET ORAL DAILY
COMMUNITY
Start: 2022-03-25

## 2022-03-29 ASSESSMENT — PAIN SCALES - GENERAL: PAINLEVEL_OUTOF10: 0

## 2022-04-06 ENCOUNTER — APPOINTMENT (OUTPATIENT)
Dept: WOUND CARE | Age: 77
End: 2022-04-06
Attending: PODIATRIST

## 2022-04-07 ENCOUNTER — HOSPITAL ENCOUNTER (OUTPATIENT)
Dept: WOUND CARE | Age: 77
Discharge: STILL ADMITTED OR ADMINISTRATIVE | End: 2022-04-07
Attending: PODIATRIST

## 2022-04-07 DIAGNOSIS — G62.9 NEUROPATHY: Primary | ICD-10-CM

## 2022-04-07 DIAGNOSIS — L89.620 PRESSURE ULCER, HEEL, LEFT, UNSTAGEABLE (CMD): ICD-10-CM

## 2022-04-07 DIAGNOSIS — S91.302D OPEN WOUND OF LEFT HEEL, SUBSEQUENT ENCOUNTER: ICD-10-CM

## 2022-04-07 DIAGNOSIS — G62.9 PERIPHERAL POLYNEUROPATHY: ICD-10-CM

## 2022-04-07 PROCEDURE — 11042 DBRDMT SUBQ TIS 1ST 20SQCM/<: CPT

## 2022-04-07 ASSESSMENT — PAIN SCALES - GENERAL: PAINLEVEL_OUTOF10: 1

## 2022-04-20 ENCOUNTER — APPOINTMENT (OUTPATIENT)
Dept: WOUND CARE | Age: 77
End: 2022-04-20
Attending: PODIATRIST

## 2022-04-21 ENCOUNTER — HOSPITAL ENCOUNTER (OUTPATIENT)
Dept: WOUND CARE | Age: 77
Discharge: STILL ADMITTED OR ADMINISTRATIVE | End: 2022-04-21
Attending: PODIATRIST

## 2022-04-21 VITALS — TEMPERATURE: 97.2 F

## 2022-04-21 DIAGNOSIS — G62.9 PERIPHERAL POLYNEUROPATHY: ICD-10-CM

## 2022-04-21 DIAGNOSIS — G62.9 NEUROPATHY: Primary | ICD-10-CM

## 2022-04-21 DIAGNOSIS — L89.620 PRESSURE ULCER, HEEL, LEFT, UNSTAGEABLE (CMD): ICD-10-CM

## 2022-04-21 PROCEDURE — 11042 DBRDMT SUBQ TIS 1ST 20SQCM/<: CPT

## 2022-05-05 ENCOUNTER — HOSPITAL ENCOUNTER (OUTPATIENT)
Dept: WOUND CARE | Age: 77
Discharge: STILL ADMITTED OR ADMINISTRATIVE | End: 2022-05-05
Attending: PODIATRIST

## 2022-05-05 DIAGNOSIS — G62.9 NEUROPATHY: Primary | ICD-10-CM

## 2022-05-05 DIAGNOSIS — S91.302D OPEN WOUND OF LEFT HEEL, SUBSEQUENT ENCOUNTER: ICD-10-CM

## 2022-05-05 PROCEDURE — 11042 DBRDMT SUBQ TIS 1ST 20SQCM/<: CPT

## 2022-05-05 ASSESSMENT — PAIN SCALES - GENERAL: PAINLEVEL_OUTOF10: 0

## 2022-06-17 ENCOUNTER — HOSPITAL ENCOUNTER (OUTPATIENT)
Dept: WOUND CARE | Age: 77
Discharge: STILL ADMITTED OR ADMINISTRATIVE | End: 2022-06-17
Attending: FAMILY MEDICINE

## 2022-06-17 VITALS
OXYGEN SATURATION: 97 % | HEART RATE: 60 BPM | SYSTOLIC BLOOD PRESSURE: 173 MMHG | DIASTOLIC BLOOD PRESSURE: 72 MMHG | TEMPERATURE: 96.5 F

## 2022-06-17 DIAGNOSIS — S31.809A OPEN WOUND OF BUTTOCK WITH COMPLICATION, UNSPECIFIED LATERALITY, INITIAL ENCOUNTER: ICD-10-CM

## 2022-06-17 DIAGNOSIS — G62.9 NEUROPATHY: Primary | ICD-10-CM

## 2022-06-17 PROCEDURE — 99213 OFFICE O/P EST LOW 20 MIN: CPT

## 2022-06-17 ASSESSMENT — PAIN SCALES - GENERAL: PAINLEVEL_OUTOF10: 0

## 2022-06-28 ENCOUNTER — HOSPITAL ENCOUNTER (OUTPATIENT)
Dept: WOUND CARE | Age: 77
Discharge: STILL ADMITTED OR ADMINISTRATIVE | End: 2022-06-28
Attending: FAMILY MEDICINE

## 2022-06-28 VITALS — TEMPERATURE: 96.2 F

## 2022-06-28 DIAGNOSIS — L89.150 PRESSURE INJURY OF SACRAL REGION, UNSTAGEABLE (CMD): Primary | ICD-10-CM

## 2022-06-28 DIAGNOSIS — G62.9 NEUROPATHY: ICD-10-CM

## 2022-06-28 PROCEDURE — 11042 DBRDMT SUBQ TIS 1ST 20SQCM/<: CPT

## 2022-06-28 ASSESSMENT — PAIN SCALES - GENERAL: PAINLEVEL_OUTOF10: 0

## 2022-07-26 ENCOUNTER — HOSPITAL ENCOUNTER (OUTPATIENT)
Dept: WOUND CARE | Age: 77
Discharge: STILL ADMITTED OR ADMINISTRATIVE | End: 2022-07-26
Attending: FAMILY MEDICINE

## 2022-07-26 VITALS — TEMPERATURE: 96.2 F

## 2022-07-26 DIAGNOSIS — S31.809A OPEN WOUND OF BUTTOCK WITH COMPLICATION, UNSPECIFIED LATERALITY, INITIAL ENCOUNTER: Primary | ICD-10-CM

## 2022-07-26 DIAGNOSIS — G62.9 NEUROPATHY: ICD-10-CM

## 2022-07-26 PROCEDURE — 11042 DBRDMT SUBQ TIS 1ST 20SQCM/<: CPT

## 2022-07-26 ASSESSMENT — PAIN SCALES - GENERAL: PAINLEVEL_OUTOF10: 0

## 2022-08-09 ENCOUNTER — APPOINTMENT (OUTPATIENT)
Dept: WOUND CARE | Age: 77
End: 2022-08-09
Attending: FAMILY MEDICINE

## 2022-08-25 ENCOUNTER — HOSPITAL ENCOUNTER (OUTPATIENT)
Dept: WOUND CARE | Age: 77
Discharge: STILL ADMITTED OR ADMINISTRATIVE | End: 2022-08-25
Attending: FAMILY MEDICINE

## 2022-08-25 VITALS — TEMPERATURE: 96.3 F

## 2022-08-25 DIAGNOSIS — S21.202A OPEN WOUND OF LEFT SIDE OF BACK, INITIAL ENCOUNTER: ICD-10-CM

## 2022-08-25 DIAGNOSIS — L89.150 PRESSURE INJURY OF SACRAL REGION, UNSTAGEABLE (CMD): Primary | ICD-10-CM

## 2022-08-25 PROCEDURE — 11042 DBRDMT SUBQ TIS 1ST 20SQCM/<: CPT

## 2022-08-25 ASSESSMENT — PAIN SCALES - GENERAL: PAINLEVEL_OUTOF10: 0

## 2022-09-07 ENCOUNTER — TELEPHONE (OUTPATIENT)
Dept: WOUND CARE | Age: 77
End: 2022-09-07

## 2022-09-13 ENCOUNTER — HOSPITAL ENCOUNTER (OUTPATIENT)
Dept: WOUND CARE | Age: 77
Discharge: STILL ADMITTED OR ADMINISTRATIVE | End: 2022-09-13
Attending: FAMILY MEDICINE

## 2022-09-13 DIAGNOSIS — G62.9 NEUROPATHY: ICD-10-CM

## 2022-09-13 DIAGNOSIS — S21.202A OPEN WOUND OF LEFT SIDE OF BACK, INITIAL ENCOUNTER: Primary | ICD-10-CM

## 2022-09-13 DIAGNOSIS — L89.150 PRESSURE INJURY OF SACRAL REGION, UNSTAGEABLE (CMD): ICD-10-CM

## 2022-09-13 PROCEDURE — 11042 DBRDMT SUBQ TIS 1ST 20SQCM/<: CPT

## 2022-09-13 ASSESSMENT — PAIN SCALES - GENERAL: PAINLEVEL_OUTOF10: 0

## 2022-09-27 ENCOUNTER — HOSPITAL ENCOUNTER (OUTPATIENT)
Dept: WOUND CARE | Age: 77
Discharge: STILL ADMITTED OR ADMINISTRATIVE | End: 2022-09-27
Attending: FAMILY MEDICINE

## 2022-09-27 VITALS — TEMPERATURE: 96.5 F

## 2022-09-27 DIAGNOSIS — T81.31XA DISRUPTION OF SURGICAL WOUND, INITIAL ENCOUNTER: ICD-10-CM

## 2022-09-27 DIAGNOSIS — S21.202A OPEN WOUND OF LEFT SIDE OF BACK, INITIAL ENCOUNTER: Primary | ICD-10-CM

## 2022-09-27 DIAGNOSIS — G62.9 NEUROPATHY: ICD-10-CM

## 2022-09-27 PROCEDURE — 99213 OFFICE O/P EST LOW 20 MIN: CPT

## 2022-09-27 PROCEDURE — 11042 DBRDMT SUBQ TIS 1ST 20SQCM/<: CPT

## 2022-09-27 ASSESSMENT — PAIN SCALES - GENERAL: PAINLEVEL_OUTOF10: 0

## 2022-10-11 ENCOUNTER — HOSPITAL ENCOUNTER (OUTPATIENT)
Dept: WOUND CARE | Age: 77
Discharge: STILL ADMITTED OR ADMINISTRATIVE | End: 2022-10-11
Attending: FAMILY MEDICINE

## 2022-10-11 VITALS — TEMPERATURE: 97 F

## 2022-10-11 DIAGNOSIS — T81.31XA DISRUPTION OF SURGICAL WOUND, INITIAL ENCOUNTER: ICD-10-CM

## 2022-10-11 DIAGNOSIS — S21.209D OPEN WOUND OF BACK, UNSPECIFIED LATERALITY, SUBSEQUENT ENCOUNTER: Primary | ICD-10-CM

## 2022-10-11 PROCEDURE — 11042 DBRDMT SUBQ TIS 1ST 20SQCM/<: CPT

## 2022-10-11 ASSESSMENT — PAIN SCALES - GENERAL: PAINLEVEL_OUTOF10: 0

## 2022-10-21 ENCOUNTER — TELEPHONE (OUTPATIENT)
Dept: WOUND CARE | Age: 77
End: 2022-10-21

## 2022-11-01 ENCOUNTER — HOSPITAL ENCOUNTER (OUTPATIENT)
Dept: WOUND CARE | Age: 77
Discharge: STILL ADMITTED OR ADMINISTRATIVE | End: 2022-11-01
Attending: FAMILY MEDICINE

## 2022-11-01 VITALS — TEMPERATURE: 96.3 F

## 2022-11-01 DIAGNOSIS — G62.9 NEUROPATHY: ICD-10-CM

## 2022-11-01 DIAGNOSIS — S21.202A OPEN WOUND OF LEFT SIDE OF BACK, INITIAL ENCOUNTER: ICD-10-CM

## 2022-11-01 DIAGNOSIS — T81.31XA DISRUPTION OF SURGICAL WOUND, INITIAL ENCOUNTER: Primary | ICD-10-CM

## 2022-11-01 PROCEDURE — 99213 OFFICE O/P EST LOW 20 MIN: CPT

## 2022-11-01 PROCEDURE — 99212 OFFICE O/P EST SF 10 MIN: CPT

## 2022-11-01 RX ORDER — AZELASTINE HYDROCHLORIDE 137 UG/1
SPRAY, METERED NASAL
COMMUNITY
Start: 2022-10-11

## 2022-11-01 ASSESSMENT — PAIN SCALES - GENERAL: PAINLEVEL_OUTOF10: 0

## 2022-11-22 ENCOUNTER — HOSPITAL ENCOUNTER (OUTPATIENT)
Dept: WOUND CARE | Age: 77
Discharge: STILL ADMITTED OR ADMINISTRATIVE | End: 2022-11-22
Attending: FAMILY MEDICINE

## 2022-11-22 VITALS — TEMPERATURE: 97.9 F

## 2022-11-22 DIAGNOSIS — S21.209D OPEN WOUND OF BACK, UNSPECIFIED LATERALITY, SUBSEQUENT ENCOUNTER: Primary | ICD-10-CM

## 2022-11-22 DIAGNOSIS — G62.9 NEUROPATHY: ICD-10-CM

## 2022-11-22 PROCEDURE — 99213 OFFICE O/P EST LOW 20 MIN: CPT

## 2022-11-22 ASSESSMENT — PAIN SCALES - GENERAL: PAINLEVEL_OUTOF10: 0

## (undated) DEVICE — UNDYED BRAIDED (POLYGLACTIN 910), SYNTHETIC ABSORBABLE SUTURE: Brand: COATED VICRYL

## (undated) DEVICE — LIGHT HANDLE

## (undated) DEVICE — WRAP COOLING BACK W/NO PILLOW

## (undated) DEVICE — Device

## (undated) DEVICE — DRAIN RESERVOIR RELIAVAC 100CC

## (undated) DEVICE — DRAIN SILICONE RND 1/8

## (undated) DEVICE — KENDALL SCD EXPRESS SLEEVES, KNEE LENGTH, MEDIUM: Brand: KENDALL SCD

## (undated) DEVICE — SUTURE VICRYL 2-0 FSL

## (undated) DEVICE — FLOSEAL HEMOSTATIC MATRIX, 5ML: Brand: FLOSEAL HEMOSTATIC MATRIX

## (undated) DEVICE — Device: Brand: INTELLICART™

## (undated) DEVICE — SUTURE VICRYL 1 OS-6

## (undated) DEVICE — SUTURE ETHILON 3-0 PS-1

## (undated) DEVICE — 3.0MM PRECISION ROUND

## (undated) DEVICE — SOL  .9 1000ML BTL

## (undated) DEVICE — STERILE POLYISOPRENE POWDER-FREE SURGICAL GLOVES: Brand: PROTEXIS

## (undated) DEVICE — 3M™ MICROFOAM™ TAPE 1528-4: Brand: 3M™ MICROFOAM™

## (undated) DEVICE — LAMINECTOMY CDS: Brand: MEDLINE INDUSTRIES, INC.

## (undated) DEVICE — MARKER SKIN PREP RESIST STRL

## (undated) NOTE — LETTER
[facility] Testing Department  Phone: [phone]  OUTSIDE TESTING RESULT REQUEST      TO:  Dr. Naida Coombs Today's Date: 2/4/21    FAX #: [phone]     IMPORTANT: FOR YOUR IMMEDIATE ATTENTION  Please FAX all test results listed below to: 902-00

## (undated) NOTE — IP AVS SNAPSHOT
[address]            (For Outpatient Use Only) Initial Admit Date: 2/18/2021   Inpt/Obs Admit Date: Inpt: 2/18/21 / Obs: N/A   Discharge Date:    Hospital Acct:  [de-identified]   MRN: [de-identified]   CSN: [id number]   CEID: [id number] Group Number:  Insurance Type:    Subscriber Name:  Subscriber :    Subscriber ID:  Pt Rel to Subscriber:    Hospital Account Financial Class: Medicare Advantage    2021

## (undated) NOTE — LETTER
[facility] Testing Department  Phone: [phone]  OUTSIDE TESTING RESULT REQUEST      TO:   Dr. Dorrine Mcardle Today's Date: 2/4/21    FAX #: ***     IMPORTANT: FOR YOUR IMMEDIATE ATTENTION  Please FAX all test results listed below to: [phone]

## (undated) NOTE — IP AVS SNAPSHOT
Patient Demographics     Address  [address] [place] [id number] Phone  [phone] [facility])  [phone] (Work)  [phone] (Mobile) *Preferred* E-mail Address  [email or web address]      Emergency Contact(s)     Name Relation Home Work Mobile IF you have a Brace / Corset  ? Use when out of bed except when showering. ? May wear even when toileting. ? Anticipate wearing for 6-12 weeks after surgery; exact time to be determined by surgeon. Discuss at office visit.   ? Put brace on:  o  when sitti ? Eat a high fiber diet (bran, vegetables, fruit). ? Use an over the counter stool softener such as Colace or senakot while taking narcotics to prevent constipation; use laxatives such as Miralax or Milk of Magnesia as needed. ?  An enema or suppository m When to contact your surgeon  ? Temp > 101F; Take your temperature twice a day  ? Increased pain, swelling, redness, or any drainage to incision. ? Separation of incision. .  ? Sudden reappearance of pain that won’t go away with pain medication.   ? New num Take 1 tablet by mouth every 6 (six) hours as needed. Consuelo Johnson MD         losartan Potassium 50 MG Tabs  Commonly known as: COZAAR      Take 1 tablet (50 mg total) by mouth 2 (two) times daily.    Moreno Diallo MD         methocarbamol 750 MG T Patient Weight  102.6 kg (226 lb 3.1 oz)      CPAP Settings (Inpatient)       Most Recent Value   Mode  Spontaneous   Interface  Patient provided mask   Set CPAP  10      Lab Results Last 24 Hours    No matching results found     Microbiology Results (All) • losartan Potassium 50 MG Oral Tab Take 1 tablet (50 mg total) by mouth 2 (two) times daily.  90 tablet 3   • amLODIPine Besylate 5 MG Oral Tab Take 5 mg by mouth daily.       • diazepam 5 MG Oral Tab Take 1 tablet (5 mg total) by mouth once as needed for Smoking status: Never Smoker      Smokeless tobacco: Never Used    Alcohol use:  Yes      Alcohol/week: 1.7 - 2.5 standard drinks      Types: 2 - 3 Standard drinks or equivalent per week      Frequency: 2-3 times a week      Drinks per session: 1 or 2 Consult Orders    1. Consult to Physical Medicine Rehab [[id number]] ordered by Effie Thomas MD at 21 1213             . [facility][DD. [address][DD.2] Patient Status:  Inpatient    1945 MRN [id number]   Location [facility] • Back problem    • Compression fracture of L3 lumbar vertebra 1980s   • Congestive heart disease ([facility] [address]    • Gait abnormality 8/21/2019    Resolved last addressed  8/21/19   • High blood pressure    • High cholesterol    • Lesion of pelvic bone     left •  acetaminophen (TYLENOL EXTRA STRENGTH) tab 1,000 mg, 1,000 mg, Oral, Once    •  lactated ringers infusion, , Intravenous, Continuous    •  [COMPLETED] celecoxib (CeleBREX) cap 200 mg, 200 mg, Oral, Once    •  [COMPLETED] ondansetron HCl (ZOFRAN) injecti •  [COMPLETED] fentaNYL citrate (SUBLIMAZE) 0.05 MG/ML injection, , ,     •  amLODIPine Besylate (NORVASC) tab 5 mg, 5 mg, Oral, Daily    •  carvedilol (COREG) tab 6.25 mg, 6.25 mg, Oral, BID    •  losartan Potassium (COZAAR) tab 50 mg, 50 mg, Oral, BID Lungs:   Resonant, clear breath sounds, quiet accessory muscles. Chest wall:  No tenderness or deformity. Cardiovascular:  Heart with regular rate rhythm, no murmurs appreciated. Radial and pedal pulses good. No cyanosis in all extremities.    Abdomen: 24 hr rehab nursing also beneficial for medication management, pressure sore prevention, bowel and bladder management, skin management.      Physiatric medical oversight to monitor kidney function, cardiac function, pulmonary status, neurologic status, Physical Therapy Notes (last 72 hours) (Notes from 2/21/2021 12:17 PM through 2/24/2021 12:17 PM)      Physical Therapy Note signed by Carmen Junior PT at 2/22/2021  5:01 PM  Version 2 of 2    Author: Carmen Junior, PT Service: Rehab A Dr. Jordan Bradford- 2016   • COLONOSCOPY     • COLONOSCOPY, POSSIBLE BIOPSY, POSSIBLE POLYPECTOMY 68222 N/A 1/3/2017    Performed by Valeria Pierce MD at [facility] 21 2003    left inguinal   • HERNIA SURGERY  11/22/13    umb he -   Moving from lying on back to sitting on the side of the bed?: A Little(min assist and lots of vc's and tactile cues for log roll te)[CK.2]   How much help from another person does the patient currently need. .. [CK.1]   -   Moving to and from a bed to a Instructed and demonstrated sit<>stand tech of \"nose over toes\" appropriate weight shift when pt struggling to get up due to poor forward weight shift.      Amb into bathroom and stand at sink with RW to urinate, then return to b/s chair as indicated abov Amb with attention to upright posture, and particularly to pressing knees into full extension as above, and pt after stretching sequence is able to increase step length for initial 15 ft of ambulation with min assist. At 15 ft of ambulation, pt stopped and Pt is highly motivated and asking to incorporate functional activities and mobility into his treatment session.  Pt has made nice progress during his hospital stay and is appropriate for highly skilled PT rehab services that can incorporate appropriate stre Goal #2 Patient is able to demonstrate transfers Sit to/from Stand at assistance level: minimum assistance[CK.1]  2/22/2021[CK. 3] Ach'd and upgrade to supervision/cga.       Goal #3 Patient is able to ambulate 40 feet with assist device: walker - rolling a • Lesion of pelvic bone     left   • Muscle weakness    • Muscular incoordination 8/10/2016    Resolved last addressed  11/2/16   • Neuropathy     idiopathic   • ARIADNA (obstructive sleep apnea) PSG 1/10/17    AHI 34 RDI 36 REM AHI 0 Supine AHI 17 non-supine Location: only mild c/o pain brief with supine<>sit transfers  Management Techniques: Activity promotion; Body mechanics;Repositioning; Other (Comment)(coordinate w/pain meds; spinal precautions)    BALANCE  Static Sitting: Normal  Dynamic Sitting: Fair +  S Sit to supine = min assist, and lots of vc's and tactile guiding for maintaining the proper form and tech. Transfers  Sit to stand = min assist   Stand to sit = min assist and vc's for slow eccentric control.      With every sit to stand transfer of mul Sit to stand and in standing at RW, therapist pressing knees into further extension to further stretch hamstrings after stretches in supine.  Once BLE's in B knee extension, then therapist provided verbal cues and min assist to open up right side lean and R Goal #2 Patient is able to demonstrate transfers Sit to/from Stand at assistance level: minimum assistance[CK.1]  2/22/2021[CK. 2] Ach'd and upgrade to supervision/cga.       Goal #3 Patient is able to ambulate 40 feet with assist device: walker - rolling a Past Medical History:   Diagnosis Date   • Anxiety state    • Back problem    • Compression fracture of L3 lumbar vertebra 1980s   • Congestive heart disease ([facility] [address]    • Gait abnormality 8/21/2019    Resolved last addressed  8/21/19   • High blood pressure Pt is pleasant and motivated to participate. Supportive wife present and included in all education. [DO.1]  \"I'd like to work on anything that will help me use the walker less. \"[DO.2]    Patient self-stated goal is transfer to rehab.     OBJECTIVE  Precaut Pt able to recall 2/3 spinal precautions. Pt able to verbalize learned body mechanics to maximize independence with sit<>stand transfers. Pt requires MOD A to achieve full stand from lower chair surface.  Performed functional mobility to bathroom with RW, b OT Treatment Plan: Balance activities; ADL training;Functional transfer training; Endurance training;Patient/Family education;Patient/Family training;Neuromuscluar reeducation; Compensatory technique education  Rehab Potential : Good  Frequency (Obs): 3-5x/we Spinal stenosis of lumbar region without neurogenic claudication      Past Medical History  Past Medical History:   Diagnosis Date   • Anxiety state    • Back problem    • Compression fracture of L3 lumbar vertebra 1980s   • Congestive heart disease ([address] \"This really helps to have the toilet seat higher. \"    OBJECTIVE  Precautions: Spine;Bed/chair alarm(bilateral AFO/shoes)    WEIGHT BEARING RESTRICTION  Weight Bearing Restriction: None                PAIN ASSESSMENT  Ratin      ACTIVITIES OF DAILY L Skilled Therapy Provided: Patient received sitting up in chair with B AFO's on and wife present and agreeable to participate in therapy. He was able to state 0/3 spine precautions initially and re-educated in spine precautions and verbalized understanding. Pt will benefit from skilled OT services to maximize independence with ADLs.   Recommend discharge to [address] rehabilitation facility where pt can participate in a comprehensive and intensive therapy program.        OT Discharge Recommendations: Acute Zoster Vaccine Recombinant Adjuvanted (Shingrix) 01/23/19       Future Appointments        Provider James Ramos    3/2/2021 3:[address], [facility] [address]    3/17/2021 12:30 PM [facility]